# Patient Record
Sex: MALE | Race: WHITE | NOT HISPANIC OR LATINO | Employment: OTHER | URBAN - METROPOLITAN AREA
[De-identification: names, ages, dates, MRNs, and addresses within clinical notes are randomized per-mention and may not be internally consistent; named-entity substitution may affect disease eponyms.]

---

## 2017-02-21 ENCOUNTER — CONVERSION ENCOUNTER (OUTPATIENT)
Dept: FAMILY MEDICINE CLINIC | Facility: CLINIC | Age: 67
End: 2017-02-21

## 2017-02-21 LAB
ALBUMIN SERPL-MCNC: 4.7 G/DL (ref 3.6–5.1)
ALBUMIN/GLOB SERPL: ABNORMAL {RATIO} (ref 1–2.5)
ALP SERPL-CCNC: 63 UNITS/L (ref 40–115)
ALT SERPL-CCNC: 16 UNITS/L (ref 9–46)
AST SERPL-CCNC: 16 UNITS/L (ref 10–35)
BASOPHILS # BLD AUTO: ABNORMAL 10*3/MM3 (ref 0–200)
BASOPHILS NFR BLD AUTO: 0.3 %
BILIRUB SERPL-MCNC: 0.4 MG/DL (ref 0.2–1.2)
BUN SERPL-MCNC: 25 MG/DL (ref 7–25)
BUN/CREAT SERPL: ABNORMAL (ref 6–22)
CALCIUM SERPL-MCNC: 9.9 MG/DL (ref 8.6–10.3)
CHLORIDE SERPL-SCNC: 103 MMOL/L (ref 98–110)
CHOLEST SERPL-MCNC: 183 MG/DL (ref 125–200)
CHOLEST/HDLC SERPL: ABNORMAL {RATIO}
CO2 CONTENT VENOUS: 28 MMOL/L (ref 20–31)
CONV % HGB A1C: ABNORMAL %
CONV NEUTROPHILS/100 LEUKOCYTES IN BODY FLUID BY MANUAL COUNT: 60.1 %
CONV TOTAL PROTEIN: 7.4 G/DL (ref 6.1–8.1)
CREAT UR-MCNC: 1.04 MG/DL (ref 0.7–1.25)
EOSINOPHIL # BLD AUTO: 1.6 %
EOSINOPHIL # BLD AUTO: ABNORMAL 10*3/MM3 (ref 15–500)
ERYTHROCYTE [DISTWIDTH] IN BLOOD BY AUTOMATED COUNT: 14.8 % (ref 11–15)
GLOBULIN UR ELPH-MCNC: ABNORMAL G/DL (ref 1.9–3.7)
GLUCOSE SERPL-MCNC: 60 MG/DL (ref 65–99)
HCT VFR BLD AUTO: 45.4 % (ref 38.5–50)
HDLC SERPL-MCNC: 67 MG/DL
HGB BLD-MCNC: 15.2 G/DL (ref 13.2–17.1)
LDLC SERPL CALC-MCNC: ABNORMAL MG/DL
LYMPHOCYTES # BLD AUTO: ABNORMAL 10*3/MM3 (ref 850–3900)
LYMPHOCYTES NFR BLD AUTO: 31.5 %
MCH RBC QN AUTO: 30.8 PG (ref 27–33)
MCHC RBC AUTO-ENTMCNC: ABNORMAL % (ref 32–36)
MCV RBC AUTO: 92.2 FL (ref 80–100)
MONOCYTES # BLD AUTO: ABNORMAL 10*3/MICROLITER (ref 200–950)
MONOCYTES NFR BLD AUTO: 6.5 %
NEUTROPHILS # BLD AUTO: ABNORMAL 10*3/MM3 (ref 1500–7800)
PLATELET # BLD AUTO: ABNORMAL 10*3/MM3 (ref 140–400)
PMV BLD AUTO: 9.6 FL (ref 7.5–12.5)
POTASSIUM SERPL-SCNC: 4.4 MMOL/L (ref 3.5–5.3)
RBC # BLD AUTO: ABNORMAL 10*6/MM3 (ref 4.2–5.8)
SODIUM SERPL-SCNC: 140 MMOL/L (ref 135–146)
TRIGL SERPL-MCNC: 185 MG/DL
WBC # BLD AUTO: ABNORMAL K/UL (ref 3.8–10.8)

## 2017-09-26 ENCOUNTER — ON CAMPUS - OUTPATIENT (AMBULATORY)
Dept: URBAN - METROPOLITAN AREA HOSPITAL 2 | Facility: HOSPITAL | Age: 67
End: 2017-09-26
Payer: COMMERCIAL

## 2017-09-26 ENCOUNTER — OFFICE (AMBULATORY)
Dept: URBAN - METROPOLITAN AREA CLINIC 64 | Facility: CLINIC | Age: 67
End: 2017-09-26
Payer: COMMERCIAL

## 2017-09-26 ENCOUNTER — HOSPITAL ENCOUNTER (OUTPATIENT)
Dept: OTHER | Facility: HOSPITAL | Age: 67
Setting detail: SPECIMEN
Discharge: HOME | End: 2017-09-26
Attending: INTERNAL MEDICINE | Admitting: INTERNAL MEDICINE

## 2017-09-26 VITALS
DIASTOLIC BLOOD PRESSURE: 87 MMHG | OXYGEN SATURATION: 97 % | OXYGEN SATURATION: 96 % | HEART RATE: 80 BPM | SYSTOLIC BLOOD PRESSURE: 123 MMHG | DIASTOLIC BLOOD PRESSURE: 84 MMHG | RESPIRATION RATE: 16 BRPM | HEART RATE: 55 BPM | DIASTOLIC BLOOD PRESSURE: 45 MMHG | HEART RATE: 74 BPM | HEART RATE: 91 BPM | OXYGEN SATURATION: 100 % | RESPIRATION RATE: 19 BRPM | DIASTOLIC BLOOD PRESSURE: 65 MMHG | RESPIRATION RATE: 15 BRPM | DIASTOLIC BLOOD PRESSURE: 81 MMHG | HEART RATE: 77 BPM | TEMPERATURE: 97.3 F | SYSTOLIC BLOOD PRESSURE: 114 MMHG | HEART RATE: 53 BPM | RESPIRATION RATE: 18 BRPM | SYSTOLIC BLOOD PRESSURE: 115 MMHG | WEIGHT: 177 LBS | SYSTOLIC BLOOD PRESSURE: 153 MMHG | DIASTOLIC BLOOD PRESSURE: 75 MMHG | SYSTOLIC BLOOD PRESSURE: 117 MMHG | OXYGEN SATURATION: 99 % | SYSTOLIC BLOOD PRESSURE: 141 MMHG | HEIGHT: 70 IN | OXYGEN SATURATION: 98 %

## 2017-09-26 DIAGNOSIS — D12.5 BENIGN NEOPLASM OF SIGMOID COLON: ICD-10-CM

## 2017-09-26 DIAGNOSIS — D12.2 BENIGN NEOPLASM OF ASCENDING COLON: ICD-10-CM

## 2017-09-26 DIAGNOSIS — Z86.010 PERSONAL HISTORY OF COLONIC POLYPS: ICD-10-CM

## 2017-09-26 LAB
GI HISTOLOGY: A. UNSPECIFIED: (no result)
GI HISTOLOGY: B. UNSPECIFIED: (no result)
GI HISTOLOGY: PDF REPORT: (no result)

## 2017-09-26 PROCEDURE — 45385 COLONOSCOPY W/LESION REMOVAL: CPT | Mod: PT

## 2017-09-26 PROCEDURE — 88305 TISSUE EXAM BY PATHOLOGIST: CPT | Mod: 26

## 2017-09-26 RX ADMIN — PROPOFOL: 10 INJECTION, EMULSION INTRAVENOUS at 08:19

## 2017-11-06 ENCOUNTER — HOSPITAL ENCOUNTER (OUTPATIENT)
Dept: LAB | Facility: HOSPITAL | Age: 67
Discharge: HOME | End: 2017-11-06
Attending: UROLOGY | Admitting: UROLOGY

## 2017-11-06 LAB — PSA SERPL-MCNC: 0.49 NG/ML (ref 0–4)

## 2017-12-21 ENCOUNTER — HOSPITAL ENCOUNTER (OUTPATIENT)
Dept: FAMILY MEDICINE CLINIC | Facility: CLINIC | Age: 67
Setting detail: SPECIMEN
Discharge: HOME | End: 2017-12-21
Attending: INTERNAL MEDICINE | Admitting: INTERNAL MEDICINE

## 2017-12-21 LAB
ANION GAP SERPL CALC-SCNC: 15.4 MMOL/L (ref 10–20)
BASOPHILS # BLD AUTO: 0.1 10*3/UL (ref 0–0.2)
BASOPHILS NFR BLD AUTO: 1 % (ref 0–2)
BUN SERPL-MCNC: 15 MG/DL (ref 8–20)
BUN/CREAT SERPL: 13.6 (ref 6.2–20.3)
CALCIUM SERPL-MCNC: 9.4 MG/DL (ref 8.9–10.3)
CHLORIDE SERPL-SCNC: 100 MMOL/L (ref 101–111)
CONV CO2: 25 MMOL/L (ref 22–32)
CREAT UR-MCNC: 1.1 MG/DL (ref 0.7–1.2)
DIFFERENTIAL METHOD BLD: (no result)
EOSINOPHIL # BLD AUTO: 0.4 10*3/UL (ref 0–0.3)
EOSINOPHIL # BLD AUTO: 3 % (ref 0–3)
ERYTHROCYTE [DISTWIDTH] IN BLOOD BY AUTOMATED COUNT: 13.3 % (ref 11.5–14.5)
GLUCOSE SERPL-MCNC: 92 MG/DL (ref 65–99)
HCT VFR BLD AUTO: 40.6 % (ref 40–54)
HGB BLD-MCNC: 13.5 G/DL (ref 14–18)
LYMPHOCYTES # BLD AUTO: 2.3 10*3/UL (ref 0.8–4.8)
LYMPHOCYTES NFR BLD AUTO: 16 % (ref 18–42)
MCH RBC QN AUTO: 30.4 PG (ref 26–32)
MCHC RBC AUTO-ENTMCNC: 33.3 G/DL (ref 32–36)
MCV RBC AUTO: 91.3 FL (ref 80–94)
MONOCYTES # BLD AUTO: 1 10*3/UL (ref 0.1–1.3)
MONOCYTES NFR BLD AUTO: 7 % (ref 2–11)
NEUTROPHILS # BLD AUTO: 10.5 10*3/UL (ref 2.3–8.6)
NEUTROPHILS NFR BLD AUTO: 73 % (ref 50–75)
NRBC BLD AUTO-RTO: 0 /100{WBCS}
NRBC/RBC NFR BLD MANUAL: 0 10*3/UL
PLATELET # BLD AUTO: 473 10*3/UL (ref 150–450)
PMV BLD AUTO: 8 FL (ref 7.4–10.4)
POTASSIUM SERPL-SCNC: 4.4 MMOL/L (ref 3.6–5.1)
RBC # BLD AUTO: 4.45 10*6/UL (ref 4.6–6)
SODIUM SERPL-SCNC: 136 MMOL/L (ref 136–144)
WBC # BLD AUTO: 14.2 10*3/UL (ref 4.5–11.5)

## 2018-01-19 ENCOUNTER — HOSPITAL ENCOUNTER (OUTPATIENT)
Dept: GENERAL RADIOLOGY | Facility: HOSPITAL | Age: 68
Discharge: HOME | End: 2018-01-19
Attending: INTERNAL MEDICINE | Admitting: INTERNAL MEDICINE

## 2018-01-19 ENCOUNTER — HOSPITAL ENCOUNTER (OUTPATIENT)
Dept: FAMILY MEDICINE CLINIC | Facility: CLINIC | Age: 68
Setting detail: SPECIMEN
Discharge: HOME | End: 2018-01-19
Attending: INTERNAL MEDICINE | Admitting: INTERNAL MEDICINE

## 2018-01-19 LAB
ALBUMIN SERPL-MCNC: 4.2 G/DL (ref 3.5–4.8)
ALBUMIN/GLOB SERPL: 1.5 {RATIO} (ref 1–1.7)
ALP SERPL-CCNC: 59 IU/L (ref 32–91)
ALT SERPL-CCNC: 23 IU/L (ref 17–63)
ANION GAP SERPL CALC-SCNC: 13.2 MMOL/L (ref 10–20)
AST SERPL-CCNC: 23 IU/L (ref 15–41)
BASOPHILS # BLD AUTO: 0.1 10*3/UL (ref 0–0.2)
BASOPHILS NFR BLD AUTO: 1 % (ref 0–2)
BILIRUB SERPL-MCNC: 0.7 MG/DL (ref 0.3–1.2)
BUN SERPL-MCNC: 22 MG/DL (ref 8–20)
BUN/CREAT SERPL: 16.9 (ref 6.2–20.3)
CALCIUM SERPL-MCNC: 9.7 MG/DL (ref 8.9–10.3)
CHLORIDE SERPL-SCNC: 100 MMOL/L (ref 101–111)
CHOLEST SERPL-MCNC: 204 MG/DL
CHOLEST/HDLC SERPL: 3.4 {RATIO}
CONV CO2: 26 MMOL/L (ref 22–32)
CONV LDL CHOLESTEROL DIRECT: 122 MG/DL (ref 0–100)
CONV TOTAL PROTEIN: 7 G/DL (ref 6.1–7.9)
CREAT UR-MCNC: 1.3 MG/DL (ref 0.7–1.2)
DIFFERENTIAL METHOD BLD: (no result)
EOSINOPHIL # BLD AUTO: 0.3 10*3/UL (ref 0–0.3)
EOSINOPHIL # BLD AUTO: 3 % (ref 0–3)
ERYTHROCYTE [DISTWIDTH] IN BLOOD BY AUTOMATED COUNT: 14.6 % (ref 11.5–14.5)
GLOBULIN UR ELPH-MCNC: 2.8 G/DL (ref 2.5–3.8)
GLUCOSE SERPL-MCNC: 81 MG/DL (ref 65–99)
HCT VFR BLD AUTO: 42.9 % (ref 40–54)
HDLC SERPL-MCNC: 61 MG/DL
HGB BLD-MCNC: 14.6 G/DL (ref 14–18)
LDLC/HDLC SERPL: 2 {RATIO}
LIPID INTERPRETATION: ABNORMAL
LYMPHOCYTES # BLD AUTO: 2.8 10*3/UL (ref 0.8–4.8)
LYMPHOCYTES NFR BLD AUTO: 29 % (ref 18–42)
MCH RBC QN AUTO: 31.3 PG (ref 26–32)
MCHC RBC AUTO-ENTMCNC: 34.1 G/DL (ref 32–36)
MCV RBC AUTO: 92 FL (ref 80–94)
MONOCYTES # BLD AUTO: 0.9 10*3/UL (ref 0.1–1.3)
MONOCYTES NFR BLD AUTO: 10 % (ref 2–11)
NEUTROPHILS # BLD AUTO: 5.5 10*3/UL (ref 2.3–8.6)
NEUTROPHILS NFR BLD AUTO: 57 % (ref 50–75)
NRBC BLD AUTO-RTO: 0 /100{WBCS}
NRBC/RBC NFR BLD MANUAL: 0 10*3/UL
PLATELET # BLD AUTO: (no result) 10*3/UL (ref 150–450)
PMV BLD AUTO: 9.1 FL (ref 7.4–10.4)
POTASSIUM SERPL-SCNC: 4.2 MMOL/L (ref 3.6–5.1)
RBC # BLD AUTO: 4.67 10*6/UL (ref 4.6–6)
SODIUM SERPL-SCNC: 135 MMOL/L (ref 136–144)
TRIGL SERPL-MCNC: 159 MG/DL
VLDLC SERPL CALC-MCNC: 21.4 MG/DL
WBC # BLD AUTO: 9.6 10*3/UL (ref 4.5–11.5)

## 2018-11-13 ENCOUNTER — HOSPITAL ENCOUNTER (OUTPATIENT)
Dept: LAB | Facility: HOSPITAL | Age: 68
Discharge: HOME | End: 2018-11-13
Attending: UROLOGY | Admitting: UROLOGY

## 2019-03-01 ENCOUNTER — CONVERSION ENCOUNTER (OUTPATIENT)
Dept: FAMILY MEDICINE CLINIC | Facility: CLINIC | Age: 69
End: 2019-03-01

## 2019-04-17 ENCOUNTER — HOSPITAL ENCOUNTER (OUTPATIENT)
Dept: SLEEP MEDICINE | Facility: HOSPITAL | Age: 69
Discharge: HOME | End: 2019-04-17
Attending: INTERNAL MEDICINE | Admitting: INTERNAL MEDICINE

## 2019-04-19 ENCOUNTER — HOSPITAL ENCOUNTER (OUTPATIENT)
Dept: SLEEP MEDICINE | Facility: HOSPITAL | Age: 69
Discharge: HOME | End: 2019-04-19
Attending: INTERNAL MEDICINE | Admitting: INTERNAL MEDICINE

## 2019-06-12 VITALS
DIASTOLIC BLOOD PRESSURE: 82 MMHG | RESPIRATION RATE: 18 BRPM | BODY MASS INDEX: 28.86 KG/M2 | WEIGHT: 201.6 LBS | HEIGHT: 70 IN | SYSTOLIC BLOOD PRESSURE: 160 MMHG | OXYGEN SATURATION: 96 % | HEART RATE: 73 BPM

## 2019-06-27 RX ORDER — LISINOPRIL AND HYDROCHLOROTHIAZIDE 20; 12.5 MG/1; MG/1
1 TABLET ORAL DAILY
COMMUNITY
End: 2020-01-02 | Stop reason: SDUPTHER

## 2019-07-01 ENCOUNTER — OFFICE VISIT (OUTPATIENT)
Dept: SLEEP MEDICINE | Facility: HOSPITAL | Age: 69
End: 2019-07-01

## 2019-07-01 VITALS
WEIGHT: 192 LBS | DIASTOLIC BLOOD PRESSURE: 74 MMHG | OXYGEN SATURATION: 95 % | SYSTOLIC BLOOD PRESSURE: 120 MMHG | BODY MASS INDEX: 27.49 KG/M2 | HEART RATE: 63 BPM | HEIGHT: 70 IN

## 2019-07-01 DIAGNOSIS — G47.33 OBSTRUCTIVE SLEEP APNEA, ADULT: Primary | ICD-10-CM

## 2019-07-01 PROCEDURE — G0463 HOSPITAL OUTPT CLINIC VISIT: HCPCS

## 2019-07-01 RX ORDER — ASPIRIN 81 MG/1
81 TABLET ORAL DAILY
COMMUNITY
End: 2022-05-25

## 2019-07-01 NOTE — PROGRESS NOTES
SLEEP MEDICINE CONSULT      Patient Identification:     Name: Jaret Monte   Age: 69 y.o.   Gender: male   : 1950   MRN: [id number]     Date of consult: 2019    PCP/Referring Physician(s):     PCP: Mari Arechiga MD  Referring Provider: Annel Mensah MD      Chief Complaint:   Obstructive sleep apnea initial follow-up for compliance after using the mask with given pressures..    History of Present Illness:   This is a very pleasant 69 years old  gentleman who underwent a nocturnal split-night polysomnogram for evaluation of obstructive sleep apnea.  He was provided with an auto BiPAP mode of therapy with a full facemask.  He is here today for initial follow-up for compliance.  Memory card has been downloaded.    Uses a full facemask which is alvina view.  He finds the mask quite uncomfortable.  Air leaks bother him.  He has to tighten his mask in order to avoid air leaks.  The nasal portion of alvina view mask usually rubs against his nostrils.  He finds the mask quite uncomfortable.  The pressures are quite tolerable to him.  Uses humidifier on regular basis.    Despite the mask being uncomfortable he has slept better with the mask and given pressures.  He does not snore with the mask in place.  Denies symptoms of dry mouth or headache at night or in the morning no symptoms of sinus congestion at night or in the morning.    He has woken up in the morning awake alert and rested remains active throughout the day. He  does not like any caffeinated beverages.  He has not taken any naps during the daytime.    Allergies, Medications, and Past History:   Allergies:    No Known Allergies  Current Medications:    Prior to Admission medications    Medication Sig Start Date End Date Taking? Authorizing Provider   aspirin 81 MG EC tablet Take 81 mg by mouth Daily.   Yes Provider, MD Johanny   lisinopril-hydrochlorothiazide (PRINZIDE,ZESTORETIC) 20-12.5 MG per tablet Take 1 tablet  "by mouth Daily.   Yes Provider, MD Johanny     Past Medical History:    Past Medical History:   Diagnosis Date   • BPH (benign prostatic hyperplasia)    • Hearing disorder    • Hepatitis C    • Hypertension       Past Surgical History:    No past surgical history on file.   Social History:    Social History     Tobacco Use   • Smoking status: Former Smoker   • Tobacco comment: Quit 2007   Substance Use Topics   • Alcohol use: Yes     Frequency: Monthly or less   • Drug use: Not on file     Family Medical History:  No family history on file.      Review of Systems:   Constitutional:  Denies fever or chills and weight gain  Eyes:  Denies change in visual acuity   HENT:  Denies nasal congestion, sore throat or post nasal drainage   Respiratory:  Denies cough, shortness of breath or dyspnea on exertion    Cardiovascular:  Denies chest pain or edema   GI:  Denies abdominal pain, nausea, vomiting, bloody stools or diarrhea   :  Denies dysuria or nocturia   Musculoskeletal:  Denies back pain or joint pain   Integument:  Denies rash   Neurologic:  Denies headache, focal weakness or sensory changes. No history of stroke or seizures.   Endocrine:  Denies polyuria or polydipsia   Lymphatic:  Denies swollen glands   Psychiatric:  Denies depression, anxiety or claustrophobia      Physical Exam:     Vitals:    07/01/19 1542   BP: 120/74   BP Location: Left arm   Pulse: 63   SpO2: 95%   Weight: 87.1 kg (192 lb)   Height: 177.8 cm (70\")     HEENT: Head is normocephalic, atraumatic, normal distribution of hair. Pupils reactive to light. Ocular movements intact. No nasal congestion on sniff test. No deviated nasal septum. No micrognathia.  Throat: Mallapatti stage 4, tongue midline, mucosa moist.  Neck: no JVD, thyromegaly, mass, +midline trachea, No lymphadenopathy.  Lungs: clear, no wheeze, rhonchi, crackles  Cardiovascular: S1, S2, RRR no murmurs, rubs or gallops  Abd: +BS's soft NT/ND, no CVA tenderness.    Ext: no edema, " cyanosis, clubbing, pulses intact  Neuro: Awake alert, oriented to time place and person. Grossly intact to motor, sensory cerebral, and cerebellar (without focal deficit)  Psych: No overt roxy, depression, psychosis or inappropriate behavior  Skin: No rash, cellulitis, or ulcerations.  No facial rash or erosions    DIAGNOSTIC DATA  Memory card download shows data from 5/10/2019 to 6/30/2019 overall 52 days of data reviewed 100% use noted.    Hours she was 9 hours 57 minutes.  Minimum time use 4 hours 3 minutes.  The question of the time the mask has been used for more than 4 hours.  The average apnea pop index is 5.6 events per hour.  He uses an auto BiPAP mode of therapy with a pressure range between 12 and 22.  Pressure support between 4 and 6.  Assessment/Plan:   Obstructive sleep apnea:  This is a very pleasant gentleman who has been diagnosed with obstructive sleep apnea.  He uses an auto BiPAP mode of therapy with a full facemask.  He is compliant with therapy.  Is getting benefit from it.    The results of the memory card download were discussed in detail with the patient all questions were answered.    Finds this mask right and comfortable.  He uses an Jayashree view mask.  Different varieties of masks were discussed in detail with him all questions were answered.  He may benefit from a full facemask of a different variety.  Recommendations.  He is advised to continue using his mask with given pressures on a nightly basis.  Is advised to use the mask with given pressures for at least 4 hours for minimum benefit or as long as he sleeps a maximum benefit.  Is advised to use the mask if he takes a nap during the daytime.  A full facemask is recommended of a different type and variety.  Allergic rhinitis:  He has remained symptomatic.  Recommendation aggressive therapy is recommended.    Driving instructions. Patient is advised to avoid driving if tired or somnolent. To avoid all alcoholic beverages or medications  causing somnolence.         Diagnosis Plan   1. Obstructive sleep apnea, adult  CPAP Therapy     No orders of the defined types were placed in this encounter.    Orders Placed This Encounter   Procedures   • CPAP Therapy     To  Chet ferrell.  Patient uses a philippe view FFM. Extremely uncomfortable for him.  PLEASE CHANGE THE MASK TO  ANOTHER VARIETY OF FULL FACE MASK.     Order Specific Question:   Equipment     Answer:    BiPAP     Order Specific Question:   PAP Tubing (1 per 3 months)     Answer:    6' Standard tubing     Order Specific Question:   PAP Mask (1 per 3 months)     Answer:    Full face mask     Order Specific Question:   Mask interface (1 per month)     Answer:    Face Mask Interface     Order Specific Question:   PAP Accessories     Answer:    Water Chamber for PAP Humidifier (1 per 6 month) &  Filter PAP Disposable (2 per month) &  Filter PAP Non-Disposable (1 per 6 months) &  Chinstrap to be used with PAP (1 per 6 months) &  Headgear to be used with PAP (1 per 6 mo)     Order Specific Question:   Does the patient have Obstructive Sleep Apnea or other qualifying diagnosis for PAP ordered?     Answer:   Yes     Order Specific Question:   Does the patient require humidification?     Answer:   Yes     Order Specific Question:   Humidifier     Answer:    Humidifier Heated for CPAP or BiPAP     Order Specific Question:   If ordering a BiPAP for MAXIMO a CPAP has been tried and/or ruled out?     Answer:   Yes     Order Specific Question:   The patient requires a PAP Device & continued use of Supplies to administer effective PAP therapy at the frequency of use ordered above     Answer:   Yes     Order Specific Question:   Initial Supplies and refills authorized for 12 months?     Answer:   Yes     Order Specific Question:   Length of Need (99 Months = Lifetime)     Answer:   99 Months = Lifetime         Annel Mensah MD  7/1/2019  4:21 PM

## 2019-10-02 ENCOUNTER — OFFICE VISIT (OUTPATIENT)
Dept: SLEEP MEDICINE | Facility: HOSPITAL | Age: 69
End: 2019-10-02

## 2019-10-02 VITALS
WEIGHT: 196.2 LBS | OXYGEN SATURATION: 97 % | HEART RATE: 66 BPM | SYSTOLIC BLOOD PRESSURE: 128 MMHG | DIASTOLIC BLOOD PRESSURE: 73 MMHG | BODY MASS INDEX: 28.09 KG/M2 | HEIGHT: 70 IN

## 2019-10-02 DIAGNOSIS — G47.33 OBSTRUCTIVE SLEEP APNEA, ADULT: Primary | ICD-10-CM

## 2019-10-02 PROCEDURE — G0463 HOSPITAL OUTPT CLINIC VISIT: HCPCS

## 2019-10-02 RX ORDER — MV-MIN/FOLIC/VIT K/LYCOP/COQ10 200-100MCG
CAPSULE ORAL
COMMUNITY
Start: 2018-01-19 | End: 2021-04-26

## 2019-10-02 NOTE — PROGRESS NOTES
SLEEP MEDICINE CONSULT      Patient Identification:     Name: Jaret Monte   Age: 69 y.o.   Gender: male   : 1950   MRN: [id number]     Date of consult: 10/2/2019    PCP/Referring Physician(s):     PCP: Mari Arechiga MD  Referring Provider: Annel Mensah MD      Chief Complaint:   Obstructive sleep apnea 3 months follow-up for compliance after using the mask with given pressures..    History of Present Illness:   This is a very pleasant 69 years old  gentleman who underwent a nocturnal split-night polysomnogram for evaluation of obstructive sleep apnea.  He was provided with an auto BiPAP mode of therapy with a full facemask.  He is here today for l follow-up for compliance after using the mask with given pressures 3 months.  Memory card has been downloaded.    He has used an Jayashree view fullface mask which she found very uncomfortable.  Presently he has changed the mask to a different type.  He finds the mask quite comfortable.  Fever air leaks noted by him.  He has wear an  interface with the mask.  .  The pressures are quite tolerable to him.  Uses humidifier on regular basis.    He has slept much better with the given pressures in the mask.  His bedtime nowadays is 8:30 PM.  Takes him approximately 15 minutes to doze off.  He usually wakes up at a 5 in the morning to start his day.  He does not wake up at night many times for a bathroom trip.  He denies any symptoms of headache or heartburn at night or in the morning.  He denies any symptoms of nasal congestion or dry mouth at night or in the morning.  He does not snore with the mask in place.    He has woken up in the morning awake alert and rested remains active throughout the day. He  does not like any caffeinated beverages.  He has not taken any naps during the daytime.  [address] sleepiness score is 10/24.    Allergies, Medications, and Past History:   Allergies:    No Known Allergies  Current Medications:    Prior to  "Admission medications    Medication Sig Start Date End Date Taking? Authorizing Provider   aspirin 81 MG EC tablet Take 81 mg by mouth Daily.   Yes Johanny Rocha MD   lisinopril-hydrochlorothiazide (PRINZIDE,ZESTORETIC) 20-12.5 MG per tablet Take 1 tablet by mouth Daily.   Yes ProviderJohanny MD     Past Medical History:    Past Medical History:   Diagnosis Date   • BPH (benign prostatic hyperplasia)    • Hearing disorder    • Hepatitis C    • Hypertension       Past Surgical History:    No past surgical history on file.   Social History:    Social History     Tobacco Use   • Smoking status: Former Smoker   • Tobacco comment: Quit 2007   Substance Use Topics   • Alcohol use: Yes     Frequency: Monthly or less   • Drug use: Not on file     Family Medical History:  History reviewed. No pertinent family history.      Review of Systems:   Constitutional:  Denies fever or chills and weight gain  Eyes:  Denies change in visual acuity   HENT:  Denies nasal congestion, sore throat or post nasal drainage   Respiratory:  Denies cough, shortness of breath or dyspnea on exertion    Cardiovascular:  Denies chest pain or edema   GI:  Denies abdominal pain, nausea, vomiting, bloody stools or diarrhea   :  Denies dysuria or nocturia   Musculoskeletal:  Denies back pain or joint pain   Integument:  Denies rash   Neurologic:  Denies headache, focal weakness or sensory changes. No history of stroke or seizures.   Endocrine:  Denies polyuria or polydipsia   Lymphatic:  Denies swollen glands   Psychiatric:  Denies depression, anxiety or claustrophobia      Physical Exam:     Vitals:    10/02/19 1124   BP: 128/73   Pulse: 66   SpO2: 97%   Weight: 89 kg (196 lb 3.2 oz)   Height: 177.8 cm (70\")     HEENT: Head is normocephalic, atraumatic, normal distribution of hair. Pupils reactive to light. Ocular movements intact. No nasal congestion on sniff test. No deviated nasal septum. No micrognathia.  Throat: Mallapatti stage 4, " tongue midline, mucosa moist.  Neck: no JVD, thyromegaly, mass, +midline trachea, No lymphadenopathy.  Lungs: clear, no wheeze, rhonchi, crackles  Cardiovascular: S1, S2, RRR no murmurs, rubs or gallops  Abd: +BS's soft NT/ND, no CVA tenderness.    Ext: no edema, cyanosis, clubbing, pulses intact  Neuro: Awake alert, oriented to time place and person. Grossly intact to motor, sensory cerebral, and cerebellar (without focal deficit)  Psych: No overt roxy, depression, psychosis or inappropriate behavior  Skin: No rash, cellulitis, or ulcerations.  No facial rash or erosions    DIAGNOSTIC DATA  Memory card download shows data from 7/10/2019 to 10/01/2019 overall 93 days of data reviewed 98.9% use noted.  Maximum hours used in hours and 42 minutes.  Minimum time used is 4 hours 3 minutes.  98.9% of the time the mask has been used for more than 4 hours.  The average apnea hypopnia index is 2.9 events per hour an auto BiPAP mode of therapy with a pressure range between 12-22 CWP.  Pressure support between 4 and 6 CWP.    Assessment/Plan:   Obstructive sleep apnea:  This is a very pleasant gentleman who has been diagnosed with obstructive sleep apnea.  He uses an auto BiPAP mode of therapy with a full facemask.  He is compliant with therapy.  Is getting benefit from it.    The results of the memory card download were discussed in detail with the patient all questions were answered.  Recommendations.  He is advised to continue using his mask with given pressures on a nightly basis.  Is advised to use the mask with given pressures for at least 4 hours for minimum benefit or as long as he sleeps a maximum benefit.  Is advised to use the mask if he takes a nap during the daytime.    Allergic rhinitis:  He has remained symptomatic.  Recommendation aggressive therapy is recommended.  He may benefit from allergy testing.  Driving instructions. Patient is advised to avoid driving if tired or somnolent. To avoid all alcoholic  beverages or medications causing somnolence.         Diagnosis Plan   1. Obstructive sleep apnea, adult  CPAP Therapy     No orders of the defined types were placed in this encounter.    Orders Placed This Encounter   Procedures   • CPAP Therapy     Order Specific Question:   PAP Tubing (1 per 3 months)     Answer:    6' Standard tubing     Order Specific Question:   PAP Mask (1 per 3 months)     Answer:    Full face mask     Order Specific Question:   Mask interface (1 per month)     Answer:    Face Mask Interface     Order Specific Question:   PAP Accessories     Answer:    Water Chamber for PAP Humidifier (1 per 6 month) &  Filter PAP Disposable (2 per month) &  Filter PAP Non-Disposable (1 per 6 months) &  Chinstrap to be used with PAP (1 per 6 months) &  Headgear to be used with PAP (1 per 6 mo)     Order Specific Question:   Does the patient have Obstructive Sleep Apnea or other qualifying diagnosis for PAP ordered?     Answer:   Yes     Order Specific Question:   Does the patient require humidification?     Answer:   Yes     Order Specific Question:   Humidifier     Answer:    Humidifier Heated for CPAP or BiPAP     Order Specific Question:   The patient requires a PAP Device & continued use of Supplies to administer effective PAP therapy at the frequency of use ordered above     Answer:   Yes     Order Specific Question:   Initial Supplies and refills authorized for 12 months?     Answer:   Yes     Order Specific Question:   The face to face evaluation was performed on     Answer:   10/2/2019     Order Specific Question:   Which [employer] company is this being sent to?     Answer:   [facility] [[id number]]     Order Specific Question:   Length of Need (99 Months = Lifetime)     Answer:   99 Months = Lifetime         Annel Mensah MD  10/2/2019  3:46 PM

## 2019-12-02 ENCOUNTER — TRANSCRIBE ORDERS (OUTPATIENT)
Dept: ADMINISTRATIVE | Facility: HOSPITAL | Age: 69
End: 2019-12-02

## 2019-12-02 ENCOUNTER — LAB (OUTPATIENT)
Dept: LAB | Facility: HOSPITAL | Age: 69
End: 2019-12-02

## 2019-12-02 DIAGNOSIS — R35.1 BPH ASSOCIATED WITH NOCTURIA: ICD-10-CM

## 2019-12-02 DIAGNOSIS — R35.1 BPH ASSOCIATED WITH NOCTURIA: Primary | ICD-10-CM

## 2019-12-02 DIAGNOSIS — N40.1 BPH ASSOCIATED WITH NOCTURIA: Primary | ICD-10-CM

## 2019-12-02 DIAGNOSIS — N40.1 BPH ASSOCIATED WITH NOCTURIA: ICD-10-CM

## 2019-12-02 LAB — PSA SERPL-MCNC: 0.86 NG/ML (ref 0–4)

## 2019-12-02 PROCEDURE — 36415 COLL VENOUS BLD VENIPUNCTURE: CPT

## 2019-12-02 PROCEDURE — 84153 ASSAY OF PSA TOTAL: CPT

## 2019-12-02 PROCEDURE — G0103 PSA SCREENING: HCPCS

## 2020-01-02 ENCOUNTER — OFFICE VISIT (OUTPATIENT)
Dept: FAMILY MEDICINE CLINIC | Facility: CLINIC | Age: 70
End: 2020-01-02

## 2020-01-02 VITALS
SYSTOLIC BLOOD PRESSURE: 120 MMHG | HEART RATE: 82 BPM | RESPIRATION RATE: 16 BRPM | BODY MASS INDEX: 29.35 KG/M2 | OXYGEN SATURATION: 97 % | WEIGHT: 205 LBS | TEMPERATURE: 99.5 F | DIASTOLIC BLOOD PRESSURE: 78 MMHG | HEIGHT: 70 IN

## 2020-01-02 DIAGNOSIS — I10 ESSENTIAL HYPERTENSION: ICD-10-CM

## 2020-01-02 DIAGNOSIS — J01.90 ACUTE NON-RECURRENT SINUSITIS, UNSPECIFIED LOCATION: Primary | ICD-10-CM

## 2020-01-02 PROBLEM — N40.0 ENLARGED PROSTATE WITHOUT LOWER URINARY TRACT SYMPTOMS (LUTS): Status: ACTIVE | Noted: 2017-02-20

## 2020-01-02 PROCEDURE — 99213 OFFICE O/P EST LOW 20 MIN: CPT | Performed by: FAMILY MEDICINE

## 2020-01-02 RX ORDER — LISINOPRIL AND HYDROCHLOROTHIAZIDE 20; 12.5 MG/1; MG/1
1 TABLET ORAL DAILY
Qty: 90 TABLET | Refills: 3 | Status: SHIPPED | OUTPATIENT
Start: 2020-01-02 | End: 2021-02-07

## 2020-01-02 RX ORDER — DOXYCYCLINE 100 MG/1
100 CAPSULE ORAL 2 TIMES DAILY
Qty: 20 CAPSULE | Refills: 0 | Status: SHIPPED | OUTPATIENT
Start: 2020-01-02 | End: 2020-03-02

## 2020-01-02 NOTE — PROGRESS NOTES
Chief Complaint   Patient presents with   • Cough       Subjective   Jaret Monte is a 69 y.o. male.     Cough   The current episode started 1 to 4 weeks ago. The problem has been gradually worsening. The cough is productive of sputum. Associated symptoms include rhinorrhea and a sore throat. Pertinent negatives include no chills, ear pain, eye redness, fever, shortness of breath or wheezing. Associated symptoms comments: Chest congestion. The symptoms are aggravated by lying down (wearing cpap machine). He has tried OTC cough suppressant for the symptoms. The treatment provided mild relief. His past medical history is significant for pneumonia.        I have reviewed and updated his medications, medical history and problem list during today's office visit.       Past Medical History :  Active Ambulatory Problems     Diagnosis Date Noted   • Other dorsalgia 12/07/2012   • Hypertension 01/02/2020   • Enlarged prostate without lower urinary tract symptoms (luts) 02/20/2017   • Encounter for screening for malignant neoplasm of prostate 07/28/2016   • History of hepatic disease 12/07/2012     Resolved Ambulatory Problems     Diagnosis Date Noted   • No Resolved Ambulatory Problems     Past Medical History:   Diagnosis Date   • Acute non-recurrent frontal sinusitis    • Advanced care planning/counseling discussion    • Allergic rhinitis due to pollen    • BPH (benign prostatic hyperplasia)    • Essential hypertension    • Fever and chills    • Hearing disorder    • Hepatitis C    • History of tobacco use    • Snoring        Medication List:    Current Outpatient Medications:   •  aspirin 81 MG EC tablet, Take 81 mg by mouth Daily., Disp: , Rfl:   •  lisinopril-hydrochlorothiazide (PRINZIDE,ZESTORETIC) 20-12.5 MG per tablet, Take 1 tablet by mouth Daily., Disp: , Rfl:   •  Multiple Vitamins-Minerals (DAILY MULTIVITAMIN) capsule, DAILY MULTIVITAMIN CAPS, Disp: , Rfl:       Social History     Tobacco Use   • Smoking  "status: Former Smoker   • Tobacco comment: Quit 2007   Substance Use Topics   • Alcohol use: Not Currently     Frequency: Monthly or less       Review of Systems   Constitutional: Negative for chills and fever.   HENT: Positive for congestion, rhinorrhea and sore throat. Negative for ear pain and trouble swallowing.    Eyes: Negative for pain, discharge and redness.   Respiratory: Positive for cough. Negative for shortness of breath and wheezing.    Gastrointestinal: Negative for diarrhea, nausea and vomiting.   Neurological: Negative for dizziness, syncope and headache.       Objective   Vitals:    01/02/20 1355   BP: 120/78   Pulse: 82   Resp: 16   Temp: 99.5 °F (37.5 °C)   TempSrc: Oral   SpO2: 97%   Weight: 93 kg (205 lb)   Height: 177.8 cm (70\")     Body mass index is 29.41 kg/m².  Physical Exam   Constitutional: He is oriented to person, place, and time. He appears well-developed and well-nourished. No distress.   HENT:   Head: Normocephalic and atraumatic.   Right Ear: External ear and ear canal normal. Tympanic membrane is not erythematous.   Left Ear: External ear and ear canal normal. Tympanic membrane is not erythematous.   Mouth/Throat: Uvula is midline. Posterior oropharyngeal erythema present. No oropharyngeal exudate or posterior oropharyngeal edema.   Eyes: Pupils are equal, round, and reactive to light. Conjunctivae and EOM are normal. Right eye exhibits no discharge. Left eye exhibits no discharge.   Neck: Normal range of motion. Neck supple.   Cardiovascular: Normal rate, regular rhythm and normal heart sounds.   No murmur heard.  Pulmonary/Chest: Effort normal and breath sounds normal. He has no wheezes. He has no rales.   Abdominal: Soft.   Musculoskeletal: He exhibits no edema.   Lymphadenopathy:     He has no cervical adenopathy.   Neurological: He is alert and oriented to person, place, and time.   Skin: Skin is warm. Capillary refill takes less than 2 seconds. No rash noted.   Psychiatric: " He has a normal mood and affect. His behavior is normal.         Lab Results   Component Value Date    PSA 0.856 12/02/2019          Assessment/Plan     Diagnoses and all orders for this visit:    1. Acute non-recurrent sinusitis, unspecified location (Primary)  -     doxycycline (MONODOX) 100 MG capsule; Take 1 capsule by mouth 2 (Two) Times a Day.  Dispense: 20 capsule; Refill: 0    2. Essential hypertension  Comments:  Stable.  Refills given.  Labs current (done by [facility]).  Orders:  -     lisinopril-hydrochlorothiazide (PRINZIDE,ZESTORETIC) 20-12.5 MG per tablet; Take 1 tablet by mouth Daily.  Dispense: 90 tablet; Refill: 3    F/U for medicare wellness exam after 2/27/20.    No follow-ups on file.

## 2020-03-02 ENCOUNTER — OFFICE VISIT (OUTPATIENT)
Dept: FAMILY MEDICINE CLINIC | Facility: CLINIC | Age: 70
End: 2020-03-02

## 2020-03-02 VITALS
SYSTOLIC BLOOD PRESSURE: 127 MMHG | HEART RATE: 57 BPM | DIASTOLIC BLOOD PRESSURE: 73 MMHG | TEMPERATURE: 97.7 F | BODY MASS INDEX: 29.58 KG/M2 | WEIGHT: 206.6 LBS | HEIGHT: 70 IN | OXYGEN SATURATION: 97 %

## 2020-03-02 DIAGNOSIS — G47.33 OBSTRUCTIVE SLEEP APNEA: ICD-10-CM

## 2020-03-02 DIAGNOSIS — Z87.891 PERSONAL HISTORY OF TOBACCO USE: ICD-10-CM

## 2020-03-02 DIAGNOSIS — Z12.5 ENCOUNTER FOR SCREENING FOR MALIGNANT NEOPLASM OF PROSTATE: ICD-10-CM

## 2020-03-02 DIAGNOSIS — I10 ESSENTIAL HYPERTENSION: ICD-10-CM

## 2020-03-02 DIAGNOSIS — Z78.9 FULL CODE STATUS: ICD-10-CM

## 2020-03-02 DIAGNOSIS — Z00.00 MEDICARE ANNUAL WELLNESS VISIT, SUBSEQUENT: Primary | ICD-10-CM

## 2020-03-02 DIAGNOSIS — Z23 PNEUMOCOCCAL VACCINATION GIVEN: ICD-10-CM

## 2020-03-02 PROCEDURE — 99497 ADVNCD CARE PLAN 30 MIN: CPT | Performed by: FAMILY MEDICINE

## 2020-03-02 PROCEDURE — 90732 PPSV23 VACC 2 YRS+ SUBQ/IM: CPT | Performed by: FAMILY MEDICINE

## 2020-03-02 PROCEDURE — G0009 ADMIN PNEUMOCOCCAL VACCINE: HCPCS | Performed by: FAMILY MEDICINE

## 2020-03-02 PROCEDURE — 99213 OFFICE O/P EST LOW 20 MIN: CPT | Performed by: FAMILY MEDICINE

## 2020-03-02 PROCEDURE — G0439 PPPS, SUBSEQ VISIT: HCPCS | Performed by: FAMILY MEDICINE

## 2020-03-02 NOTE — PROGRESS NOTES
The ABCs of the Annual Wellness Visit  Subsequent Medicare Wellness Visit    Chief Complaint   Patient presents with   • Medicare Wellness-subsequent   • Hypertension       Subjective   History of Present Illness:  Jaret Monte is a 70 y.o. male who presents for a Subsequent Medicare Wellness Visit.    Hypertension   This is a chronic problem. The current episode started more than 1 year ago. The problem is controlled. Pertinent negatives include no blurred vision, chest pain, headaches, orthopnea, palpitations, peripheral edema or shortness of breath. Current antihypertension treatment includes ACE inhibitors and diuretics. The current treatment provides significant improvement. There are no compliance problems.      Patient follows routinely with Dr. Crawford, who is monitoring his PSA levels and doing his digital rectal exams.    HEALTH RISK ASSESSMENT    Recent Hospitalizations:  No hospitalization(s) within the last year.    Current Medical Providers:  Patient Care Team:  Mari Arechiga MD as PCP - General  Mari Arechiga MD as PCP - Family Medicine  Annel Mensah MD as PCP - [facility]   Hubert Crawford MD - urology    Smoking Status:  Social History     Tobacco Use   Smoking Status Former Smoker   Smokeless Tobacco Never Used   Tobacco Comment    Quit 2007       Alcohol Consumption:  Social History     Substance and Sexual Activity   Alcohol Use Not Currently   • Frequency: Monthly or less       Depression Screen:   PHQ-2/PHQ-9 Depression Screening 3/2/2020   Little interest or pleasure in doing things 0   Feeling down, depressed, or hopeless 0   Trouble falling or staying asleep, or sleeping too much 0   Feeling tired or having little energy 0   Poor appetite or overeating 0   Feeling bad about yourself - or that you are a failure or have let yourself or your family down 0   Trouble concentrating on things, such as reading the newspaper or watching television 0    Moving or speaking so slowly that other people could have noticed. Or the opposite - being so fidgety or restless that you have been moving around a lot more than usual 0   Thoughts that you would be better off dead, or of hurting yourself in some way 0   Total Score 0   If you checked off any problems, how difficult have these problems made it for you to do your work, take care of things at home, or get along with other people? Not difficult at all       Fall Risk Screen:  DAYANARA Fall Risk Assessment was completed, and patient is at LOW risk for falls.Assessment completed on:3/2/2020    Health Habits and Functional and Cognitive Screening:  Functional & Cognitive Status 3/2/2020   Do you have difficulty preparing food and eating? No   Do you have difficulty bathing yourself, getting dressed or grooming yourself? No   Do you have difficulty using the toilet? No   Do you have difficulty moving around from place to place? No   Do you have trouble with steps or getting out of a bed or a chair? No   Current Diet Well Balanced Diet   Do you need help using the phone?  No   Are you deaf or do you have serious difficulty hearing?  Yes   Do you need help with transportation? No   Do you need help shopping? No   Do you need help preparing meals?  No   Do you need help with housework?  No   Do you need help with laundry? No   Do you need help taking your medications? No   Do you need help managing money? No   Do you ever drive or ride in a car without wearing a seat belt? No   Have you felt unusual stress, anger or loneliness in the last month? No   Who do you live with? Spouse   If you need help, do you have trouble finding someone available to you? No   Do you have difficulty concentrating, remembering or making decisions? No     Flowsheet was completed by medical assistant.  Data for functional and cognitive status did not save in chart.  ACE III Mini score was 24 out of 30.  Functional and cognitive status completed by  MD after visit based on our conversation during his visit.    Does the patient have evidence of cognitive impairment? No    Asprin use counseling:Taking ASA appropriately as indicated    Age-appropriate Screening Schedule:  Refer to the list below for future screening recommendations based on patient's age, sex and/or medical conditions. Orders for these recommended tests are listed in the plan section. The patient has been provided with a written plan.    Health Maintenance   Topic Date Due   • TDAP/TD VACCINES (1 - Tdap) 01/10/1961   • ZOSTER VACCINE (1 of 2) 01/10/2002   • COLONOSCOPY  09/26/2020   • INFLUENZA VACCINE  Addressed          The following portions of the patient's history were reviewed and updated as appropriate: allergies, current medications, past family history, past medical history, past social history, past surgical history and problem list.    Outpatient Medications Prior to Visit   Medication Sig Dispense Refill   • aspirin 81 MG EC tablet Take 81 mg by mouth Daily.     • lisinopril-hydrochlorothiazide (PRINZIDE,ZESTORETIC) 20-12.5 MG per tablet Take 1 tablet by mouth Daily. 90 tablet 3   • Multiple Vitamins-Minerals (DAILY MULTIVITAMIN) capsule DAILY MULTIVITAMIN CAPS           Patient Active Problem List   Diagnosis   • Other dorsalgia   • Hypertension   • Enlarged prostate without lower urinary tract symptoms (luts)   • Encounter for screening for malignant neoplasm of prostate   • History of hepatitis C   • Obstructive sleep apnea   • Full code status       Advanced Care Planning:  ACP discussion was held with the patient during this visit. Patient does not have an advance directive, information provided.  We discussed his advanced directives today.  He does not have any signed documents.  Last discussion on advanced directives was at his Medicare wellness visit on 2/27/2019.  Patient has designated his wife (Emperatriz Monte) as his medical designee.  He does not have a formal medical power  of , however.  Patient has made the decision to be a full code.  However, he would not want any medically unnecessary treatments which would prolong the dying process.  Patient was given a copy of the [place] living will form, [place] medical designee form, and [place] out of hospital DNR form for his review.  He was asked to return these documents to the office to upload into his medical record if he chose to sign them.  Time spent counseling greater than 16 minutes.      Review of Systems   Constitutional: Negative for activity change, appetite change, chills, fever and unexpected weight change.   HENT: Positive for hearing loss (Chronic, stable). Negative for congestion, ear pain, sore throat and trouble swallowing.    Eyes: Negative for blurred vision, pain, redness and visual disturbance.   Respiratory: Negative for cough, shortness of breath and wheezing.    Cardiovascular: Negative for chest pain, palpitations, orthopnea and leg swelling.   Gastrointestinal: Negative for abdominal pain, blood in stool, constipation, diarrhea and vomiting.   Genitourinary: Negative for difficulty urinating, dysuria, hematuria and testicular pain.   Musculoskeletal: Negative for gait problem and neck stiffness.   Skin: Negative for rash.        No wounds.  He will soon be seeing dermatology for skin check.   Allergic/Immunologic: Negative for immunocompromised state.   Neurological: Negative for seizures, syncope, facial asymmetry, speech difficulty, weakness and headaches.   Hematological: Negative for adenopathy. Bruises/bleeds easily.   Psychiatric/Behavioral: Negative for confusion, hallucinations, sleep disturbance and suicidal ideas.       Compared to one year ago, the patient feels his physical health is better.  Compared to one year ago, the patient feels his mental health is the same.    Reviewed chart for potential of high risk medication in the elderly: yes  Reviewed chart for potential of harmful drug  "interactions in the elderly:yes    Objective         Vitals:    03/02/20 0812   BP: 127/73   Pulse: 57   Temp: 97.7 °F (36.5 °C)   TempSrc: Oral   SpO2: 97%   Weight: 93.7 kg (206 lb 9.6 oz)   Height: 177.8 cm (70\")       Body mass index is 29.64 kg/m².  Discussed the patient's BMI with him. The BMI is above average; BMI management plan is completed.    Physical Exam   Constitutional: He is oriented to person, place, and time. He appears well-developed and well-nourished. No distress.   HENT:   Head: Normocephalic and atraumatic.   Right Ear: External ear and ear canal normal. Tympanic membrane is not erythematous and not bulging. Tympanic membrane mobility is normal.   Left Ear: External ear and ear canal normal. Tympanic membrane is not erythematous and not bulging. Tympanic membrane mobility is normal.   Mouth/Throat: Oropharynx is clear and moist. No oropharyngeal exudate.   Eyes: Pupils are equal, round, and reactive to light. Conjunctivae and EOM are normal. Right eye exhibits no discharge. Left eye exhibits no discharge. No scleral icterus.   Neck: Normal range of motion. Neck supple. No JVD present.   Cardiovascular: Normal rate, regular rhythm, normal heart sounds and intact distal pulses.   No murmur heard.  Pulses:       Radial pulses are 2+ on the right side, and 2+ on the left side.        Posterior tibial pulses are 2+ on the right side, and 2+ on the left side.   Pulmonary/Chest: Effort normal and breath sounds normal. He has no wheezes. He has no rales.   Abdominal: Soft. Bowel sounds are normal. There is no tenderness. There is no rebound and no guarding.   Genitourinary:   Genitourinary Comments: Exam not performed   Musculoskeletal: Normal range of motion. He exhibits no edema.   Lymphadenopathy:     He has no cervical adenopathy.   Neurological: He is alert and oriented to person, place, and time. He displays normal reflexes. He exhibits normal muscle tone.   Skin: Skin is warm. Capillary refill " takes less than 2 seconds. No rash noted. No erythema.   Psychiatric: He has a normal mood and affect. His behavior is normal. Judgment and thought content normal.             Assessment/Plan   Medicare Risks and Personalized Health Plan  CMS Preventative Services Quick Reference  Abdominal Aortic Aneurysm Screening -screening scheduled  Advance Directive Discussion -see discussion above  Cardiovascular risk -fasting labs ordered.  [facility]  Colon Cancer Screening -colorectal screening is current until later this year.  Next colonoscopy due 9/26/2020.  Dementia/Memory -screening as above.  Patient did have some difficulty recalling the address.  However, as separate recall question was asked, and patient completed without any difficulty.  He denies any concerns about his memory.  Depression/Dysphoria -screening negative  Diabetic Lab Screening -fasting labs ordered  Fall Risk -see assessment above  Hearing Problem -he is chronic hearing loss and wears hearing aids, he has no new concerns   Immunizations Discussed/Encouraged (specific immunizations; adacel Tdap, Pneumococcal 23 and Shingrix ) - -he was directed to the pharmacy for Tdap and Shingrix vaccinations.  Pneumonia vaccine was updated today.  Obesity/Overweight   Prostate Cancer Screening -this is conducted by his urologist.    The above risks/problems have been discussed with the patient.  Pertinent information has been shared with the patient in the After Visit Summary.  Follow up plans and orders are seen below in the Assessment/Plan Section.      Diagnoses and all orders for this visit:    1. Medicare annual wellness visit, subsequent (Primary)    2. Encounter for screening for malignant neoplasm of prostate  Comments:  Managed by specialist.  PSA is current and available for review in the chart.    3. Essential hypertension  Comments:  Controlled.  Fasting labs ordered.  Continue current medications.  Orders:  -      Comprehensive Metabolic Panel  -     Lipid Panel  -     CBC & Differential  -     Cancel: POC Urinalysis Dipstick, Multipro    4. Full code status    5. Obstructive sleep apnea  Comments:  He reports compliance with CPAP usage.    6. Pneumococcal vaccination given  -     Pneumococcal Polysaccharide Vaccine 23-Valent (PPSV23) Greater Than or Equal To 1yo Subcutaneous / IM    7. Personal history of tobacco use  Comments:  He is agreeable to AAA screen.  Orders:  -     [place] aaa screen limited; Future    Other orders  -     Cancel: PSA Screen      Follow Up:  Return if symptoms worsen or fail to improve.     An After Visit Summary and PPPS were given to the patient.

## 2020-03-03 ENCOUNTER — HOSPITAL ENCOUNTER (OUTPATIENT)
Dept: ULTRASOUND IMAGING | Facility: HOSPITAL | Age: 70
Discharge: HOME | End: 2020-03-03
Admitting: FAMILY MEDICINE

## 2020-03-03 DIAGNOSIS — Z87.891 PERSONAL HISTORY OF TOBACCO USE: ICD-10-CM

## 2020-03-03 DIAGNOSIS — Z78.9 FULL CODE STATUS: ICD-10-CM

## 2020-03-03 DIAGNOSIS — Z12.5 ENCOUNTER FOR SCREENING FOR MALIGNANT NEOPLASM OF PROSTATE: ICD-10-CM

## 2020-03-03 PROCEDURE — 76706 US ABDL AORTA SCREEN AAA: CPT

## 2020-03-04 LAB
ALBUMIN SERPL-MCNC: 4.7 G/DL (ref 3.8–4.8)
ALBUMIN/GLOB SERPL: 2 {RATIO} (ref 1.2–2.2)
ALP SERPL-CCNC: 66 IU/L (ref 39–117)
ALT SERPL-CCNC: 19 IU/L (ref 0–44)
AST SERPL-CCNC: 19 IU/L (ref 0–40)
BASOPHILS # BLD AUTO: 0 X10E3/UL (ref 0–0.2)
BASOPHILS NFR BLD AUTO: 0 %
BILIRUB SERPL-MCNC: 0.4 MG/DL (ref 0–1.2)
BUN SERPL-MCNC: 26 MG/DL (ref 8–27)
BUN/CREAT SERPL: 21 (ref 10–24)
CALCIUM SERPL-MCNC: 9.9 MG/DL (ref 8.6–10.2)
CHLORIDE SERPL-SCNC: 99 MMOL/L (ref 96–106)
CHOLEST SERPL-MCNC: 180 MG/DL (ref 100–199)
CO2 SERPL-SCNC: 24 MMOL/L (ref 20–29)
CREAT SERPL-MCNC: 1.23 MG/DL (ref 0.76–1.27)
EOSINOPHIL # BLD AUTO: 0.2 X10E3/UL (ref 0–0.4)
EOSINOPHIL NFR BLD AUTO: 2 %
ERYTHROCYTE [DISTWIDTH] IN BLOOD BY AUTOMATED COUNT: 14.3 % (ref 11.6–15.4)
GLOBULIN SER CALC-MCNC: 2.3 G/DL (ref 1.5–4.5)
GLUCOSE SERPL-MCNC: 88 MG/DL (ref 65–99)
HCT VFR BLD AUTO: 42.4 % (ref 37.5–51)
HDLC SERPL-MCNC: 55 MG/DL
HGB BLD-MCNC: 14 G/DL (ref 13–17.7)
IMM GRANULOCYTES # BLD AUTO: 0 X10E3/UL (ref 0–0.1)
IMM GRANULOCYTES NFR BLD AUTO: 0 %
LDLC SERPL CALC-MCNC: 96 MG/DL (ref 0–99)
LYMPHOCYTES # BLD AUTO: 3.1 X10E3/UL (ref 0.7–3.1)
LYMPHOCYTES NFR BLD AUTO: 28 %
MCH RBC QN AUTO: 30.1 PG (ref 26.6–33)
MCHC RBC AUTO-ENTMCNC: 33 G/DL (ref 31.5–35.7)
MCV RBC AUTO: 91 FL (ref 79–97)
MONOCYTES # BLD AUTO: 0.7 X10E3/UL (ref 0.1–0.9)
MONOCYTES NFR BLD AUTO: 6 %
NEUTROPHILS # BLD AUTO: 7.1 X10E3/UL (ref 1.4–7)
NEUTROPHILS NFR BLD AUTO: 64 %
PLATELET # BLD AUTO: 321 X10E3/UL (ref 150–450)
POTASSIUM SERPL-SCNC: 4.5 MMOL/L (ref 3.5–5.2)
PROT SERPL-MCNC: 7 G/DL (ref 6–8.5)
RBC # BLD AUTO: 4.65 X10E6/UL (ref 4.14–5.8)
SODIUM SERPL-SCNC: 138 MMOL/L (ref 134–144)
TRIGL SERPL-MCNC: 144 MG/DL (ref 0–149)
VLDLC SERPL CALC-MCNC: 29 MG/DL (ref 5–40)
WBC # BLD AUTO: 11.1 X10E3/UL (ref 3.4–10.8)

## 2020-08-21 ENCOUNTER — TELEPHONE (OUTPATIENT)
Dept: FAMILY MEDICINE CLINIC | Facility: CLINIC | Age: 70
End: 2020-08-21

## 2020-08-21 NOTE — TELEPHONE ENCOUNTER
"Patient called stating for the last 6 days he has had a fever, feeling weak and tired as well as a cough patient did go yesterday and got tested for covid.\" Just wanted to informed my pcp what was going on with me.\"  "

## 2020-08-24 NOTE — TELEPHONE ENCOUNTER
Patient called stating that his covid testing came back positive and does have a fever cough headache and is staying home for 14 days

## 2020-08-26 ENCOUNTER — OFFICE VISIT (OUTPATIENT)
Dept: FAMILY MEDICINE CLINIC | Facility: CLINIC | Age: 70
End: 2020-08-26

## 2020-08-26 ENCOUNTER — HOSPITAL ENCOUNTER (EMERGENCY)
Facility: HOSPITAL | Age: 70
Discharge: HOME | End: 2020-08-26
Admitting: EMERGENCY MEDICINE

## 2020-08-26 ENCOUNTER — APPOINTMENT (OUTPATIENT)
Dept: GENERAL RADIOLOGY | Facility: HOSPITAL | Age: 70
End: 2020-08-26

## 2020-08-26 VITALS
TEMPERATURE: 98.2 F | RESPIRATION RATE: 19 BRPM | SYSTOLIC BLOOD PRESSURE: 121 MMHG | DIASTOLIC BLOOD PRESSURE: 69 MMHG | HEART RATE: 78 BPM | HEIGHT: 71 IN | WEIGHT: 196 LBS | OXYGEN SATURATION: 100 % | BODY MASS INDEX: 27.44 KG/M2

## 2020-08-26 DIAGNOSIS — R53.1 WEAKNESS: ICD-10-CM

## 2020-08-26 DIAGNOSIS — U07.1 CLINICAL DIAGNOSIS OF SEVERE ACUTE RESPIRATORY SYNDROME CORONAVIRUS 2 (SARS-COV-2) DISEASE: Primary | ICD-10-CM

## 2020-08-26 DIAGNOSIS — B34.2 CORONAVIRUS INFECTION: Primary | ICD-10-CM

## 2020-08-26 DIAGNOSIS — R06.02 SHORTNESS OF BREATH: ICD-10-CM

## 2020-08-26 LAB
ALBUMIN SERPL-MCNC: 3.9 G/DL (ref 3.5–5.2)
ALBUMIN/GLOB SERPL: 1.3 G/DL
ALP SERPL-CCNC: 61 U/L (ref 39–117)
ALT SERPL W P-5'-P-CCNC: 15 U/L (ref 1–41)
ANION GAP SERPL CALCULATED.3IONS-SCNC: 15 MMOL/L (ref 5–15)
AST SERPL-CCNC: 25 U/L (ref 1–40)
BASOPHILS # BLD AUTO: 0 10*3/MM3 (ref 0–0.2)
BASOPHILS NFR BLD AUTO: 0.4 % (ref 0–1.5)
BILIRUB SERPL-MCNC: 0.4 MG/DL (ref 0–1.2)
BUN SERPL-MCNC: 23 MG/DL (ref 8–23)
BUN SERPL-MCNC: ABNORMAL MG/DL
BUN/CREAT SERPL: ABNORMAL
CALCIUM SPEC-SCNC: 8.4 MG/DL (ref 8.6–10.5)
CHLORIDE SERPL-SCNC: 93 MMOL/L (ref 98–107)
CO2 SERPL-SCNC: 24 MMOL/L (ref 22–29)
CREAT SERPL-MCNC: 1.48 MG/DL (ref 0.76–1.27)
DEPRECATED RDW RBC AUTO: 42 FL (ref 37–54)
EOSINOPHIL # BLD AUTO: 0 10*3/MM3 (ref 0–0.4)
EOSINOPHIL NFR BLD AUTO: 0.2 % (ref 0.3–6.2)
ERYTHROCYTE [DISTWIDTH] IN BLOOD BY AUTOMATED COUNT: 13.7 % (ref 12.3–15.4)
GFR SERPL CREATININE-BSD FRML MDRD: 47 ML/MIN/1.73
GLOBULIN UR ELPH-MCNC: 2.9 GM/DL
GLUCOSE SERPL-MCNC: 100 MG/DL (ref 65–99)
HCT VFR BLD AUTO: 37.7 % (ref 37.5–51)
HGB BLD-MCNC: 13.1 G/DL (ref 13–17.7)
INR PPP: 0.99 (ref 0.93–1.1)
LYMPHOCYTES # BLD AUTO: 1.4 10*3/MM3 (ref 0.7–3.1)
LYMPHOCYTES NFR BLD AUTO: 16.9 % (ref 19.6–45.3)
MCH RBC QN AUTO: 30.8 PG (ref 26.6–33)
MCHC RBC AUTO-ENTMCNC: 34.8 G/DL (ref 31.5–35.7)
MCV RBC AUTO: 88.7 FL (ref 79–97)
MONOCYTES # BLD AUTO: 0.5 10*3/MM3 (ref 0.1–0.9)
MONOCYTES NFR BLD AUTO: 6.2 % (ref 5–12)
NEUTROPHILS NFR BLD AUTO: 6.4 10*3/MM3 (ref 1.7–7)
NEUTROPHILS NFR BLD AUTO: 76.3 % (ref 42.7–76)
NRBC BLD AUTO-RTO: 0.1 /100 WBC (ref 0–0.2)
PLATELET # BLD AUTO: 303 10*3/MM3 (ref 140–450)
PMV BLD AUTO: 8.3 FL (ref 6–12)
POTASSIUM SERPL-SCNC: 4.1 MMOL/L (ref 3.5–5.2)
PROT SERPL-MCNC: 6.8 G/DL (ref 6–8.5)
PROTHROMBIN TIME: 10.7 SECONDS (ref 9.6–11.7)
RBC # BLD AUTO: 4.25 10*6/MM3 (ref 4.14–5.8)
SODIUM SERPL-SCNC: 132 MMOL/L (ref 136–145)
WBC # BLD AUTO: 8.4 10*3/MM3 (ref 3.4–10.8)

## 2020-08-26 PROCEDURE — 93005 ELECTROCARDIOGRAM TRACING: CPT

## 2020-08-26 PROCEDURE — 96360 HYDRATION IV INFUSION INIT: CPT

## 2020-08-26 PROCEDURE — 85610 PROTHROMBIN TIME: CPT | Performed by: NURSE PRACTITIONER

## 2020-08-26 PROCEDURE — 99283 EMERGENCY DEPT VISIT LOW MDM: CPT

## 2020-08-26 PROCEDURE — 71045 X-RAY EXAM CHEST 1 VIEW: CPT

## 2020-08-26 PROCEDURE — 80053 COMPREHEN METABOLIC PANEL: CPT | Performed by: NURSE PRACTITIONER

## 2020-08-26 PROCEDURE — 99442 PR PHYS/QHP TELEPHONE EVALUATION 11-20 MIN: CPT | Performed by: FAMILY MEDICINE

## 2020-08-26 PROCEDURE — 85025 COMPLETE CBC W/AUTO DIFF WBC: CPT | Performed by: NURSE PRACTITIONER

## 2020-08-26 RX ORDER — SODIUM CHLORIDE 0.9 % (FLUSH) 0.9 %
10 SYRINGE (ML) INJECTION AS NEEDED
Status: DISCONTINUED | OUTPATIENT
Start: 2020-08-26 | End: 2020-08-26 | Stop reason: HOSPADM

## 2020-08-26 RX ADMIN — SODIUM CHLORIDE 1000 ML: 0.9 INJECTION, SOLUTION INTRAVENOUS at 18:30

## 2020-08-26 NOTE — ED PROVIDER NOTES
Subjective   Patient is a 70-year-old male who was diagnosed with COVID-19 last Thursday.  Through primary care he states that he is not having any shortness of breath he just feels weak-and states he has had decreased oral intake due to his weakness.  He states he lives at home with his wife but is keeping himself quarantine in the back bedroom.  He denies pain.  He states he has not had fever for several days.          Review of Systems   Constitutional: Positive for activity change and appetite change. Negative for chills, fatigue and fever.   HENT: Negative for congestion, tinnitus and trouble swallowing.    Eyes: Negative for photophobia, discharge and redness.   Respiratory: Negative for cough and shortness of breath.    Cardiovascular: Negative for chest pain and palpitations.   Gastrointestinal: Negative for abdominal pain, diarrhea, nausea and vomiting.   Genitourinary: Negative for dysuria, frequency and urgency.   Musculoskeletal: Negative for back pain, joint swelling and myalgias.   Skin: Negative for rash.   Neurological: Positive for weakness. Negative for dizziness and headaches.   Psychiatric/Behavioral: Negative for confusion.   All other systems reviewed and are negative.      Past Medical History:   Diagnosis Date   • Acute non-recurrent frontal sinusitis    • Advanced care planning/counseling discussion    • Allergic rhinitis due to pollen    • BPH (benign prostatic hyperplasia)    • Essential hypertension    • Fever and chills    • Hearing disorder    • Hepatitis C    • History of tobacco use    • Hypertension    • Snoring        No Known Allergies    Past Surgical History:   Procedure Laterality Date   • OTHER SURGICAL HISTORY  06/2011    Turp enlarged prostate       Family History   Problem Relation Age of Onset   • Coronary artery disease Other         family members in general   • Hypertension Other         family members in general       Social History     Socioeconomic History   • Marital  "status:      Spouse name: Not on file   • Number of children: Not on file   • Years of education: Not on file   • Highest education level: Not on file   Occupational History   • Occupation: Retired   Tobacco Use   • Smoking status: Former Smoker   • Smokeless tobacco: Never Used   • Tobacco comment: Quit 2007   Substance and Sexual Activity   • Alcohol use: Not Currently     Frequency: Monthly or less   • Drug use: Not Currently   • Sexual activity: Defer           Objective   Physical Exam   Constitutional: He is oriented to person, place, and time. He appears well-developed and well-nourished.   HENT:   Head: Normocephalic and atraumatic.   Mouth/Throat: Oropharynx is clear and moist.   Eyes: Pupils are equal, round, and reactive to light. Conjunctivae and EOM are normal.   Neck: Normal range of motion. Neck supple.   Cardiovascular: Normal rate, regular rhythm, normal heart sounds and intact distal pulses.   Pulmonary/Chest: Effort normal and breath sounds normal.   Abdominal: Soft. Bowel sounds are normal.   Musculoskeletal: Normal range of motion.        Right lower leg: Normal.        Left lower leg: Normal.   Neurological: He is alert and oriented to person, place, and time. GCS eye subscore is 4. GCS verbal subscore is 5. GCS motor subscore is 6.   Skin: Skin is warm and dry.   Psychiatric: He has a normal mood and affect. His behavior is normal.   Vitals reviewed.      Procedures  EKG shows sinus rhythm with a rate of 64 no ectopy no ST elevation reviewed by myself read by Dr. Otto Landaverde         ED Course      /76 (BP Location: Left arm, Patient Position: Sitting)   Pulse 69   Temp 98.3 °F (36.8 °C) (Oral)   Resp 24   Ht 180.3 cm (71\")   Wt 88.9 kg (196 lb)   SpO2 100%   BMI 27.34 kg/m²   Labs Reviewed   COMPREHENSIVE METABOLIC PANEL - Abnormal; Notable for the following components:       Result Value    Glucose 100 (*)     Creatinine 1.48 (*)     Sodium 132 (*)     Chloride 93 (*)     " Calcium 8.4 (*)     eGFR Non  Amer 47 (*)     All other components within normal limits    Narrative:     GFR Normal >60  Chronic Kidney Disease <60  Kidney Failure <15     CBC WITH AUTO DIFFERENTIAL - Abnormal; Notable for the following components:    Neutrophil % 76.3 (*)     Lymphocyte % 16.9 (*)     Eosinophil % 0.2 (*)     All other components within normal limits   PROTIME-INR - Normal   BUN - Normal   CBC AND DIFFERENTIAL    Narrative:     The following orders were created for panel order CBC & Differential.  Procedure                               Abnormality         Status                     ---------                               -----------         ------                     CBC Auto Differential[[id number]]        Abnormal            Final result                 Please view results for these tests on the individual orders.     Medications   sodium chloride 0.9 % flush 10 mL (has no administration in time range)   sodium chloride 0.9 % bolus 1,000 mL (1,000 mL Intravenous New Bag 8/26/20 1830)     Xr Chest 1 View    Result Date: 8/26/2020  Mild interstitial prominence which can be seen with an atypical infection such as Covid 19.  Electronically Signed By-Ze Montilla On:8/26/2020 6:46 PM This report was finalized on [id number] by  Ze Montilla, .                                         MDM  Number of Diagnoses or Management Options  Clinical diagnosis of severe acute respiratory syndrome coronavirus 2 (SARS-CoV-2) disease:   Weakness:   Diagnosis management comments: Appropriate PPE was used in the evaluation of this patient that his COVID positive.--Patient had IV established and blood work was obtained patient was given 1 L of normal saline and EKG and chest x-ray were obtained-EKG was consistent with COVID-19 which patient already had positive results.  Blood work was obtained and remained stable patient had an N 95 on without oxygen usage throughout his emergency room stay and his oxygen  saturation stayed 100%.  He was given 1 L of fluids and states he feels better.  He will be discharged home to follow-up with primary care or return if worse he verbalized understood discharge instructions       Amount and/or Complexity of Data Reviewed  Clinical lab tests: reviewed  Tests in the radiology section of CPT®: reviewed  Tests in the medicine section of CPT®: reviewed    Patient Progress  Patient progress: improved      Final diagnoses:   Clinical diagnosis of severe acute respiratory syndrome coronavirus 2 (SARS-CoV-2) disease   Weakness            Tracy Berg, APRN  08/26/20 1956

## 2020-08-26 NOTE — PROGRESS NOTES
Chief Complaint   Patient presents with   • Shortness of Breath     COVID-19 positive   • Fatigue       You have chosen to receive care through a telephone visit. Do you consent to use a telephone visit for your medical care today? Yes      Subjective   Jaret Monte is a 70 y.o. male.     History of Present Illness   Patient was recently diagnosed with COVID-19.  Swab done 8/20/2020 through [facility].  He developed symptoms 3 days prior to testing.  Illness present for 9 days total.   He has not yet seen a physician.  Visit today to discuss symptoms.  He reports still being very weak.  He reports shortness of breath that has been stable (but wife indicates that he is as well as he lets on over the phone).  He is short of breath with rest, with mildly productive cough . Because he has been short of breath, he has been using his CPAP machine to help him sleep.  He reports no vomiting or diarrhea and is trying to stay hydrated.  Current treatment includes tylenol for fever (fever has been 99+ over the past few days).      I have reviewed and updated his medications, medical history and problem list during today's office visit.       Past Medical History :  Active Ambulatory Problems     Diagnosis Date Noted   • Other dorsalgia 12/07/2012   • Hypertension 01/02/2020   • Enlarged prostate without lower urinary tract symptoms (luts) 02/20/2017   • Encounter for screening for malignant neoplasm of prostate 07/28/2016   • History of hepatitis C 12/07/2012   • Obstructive sleep apnea 03/02/2020   • Full code status 03/02/2020     Resolved Ambulatory Problems     Diagnosis Date Noted   • No Resolved Ambulatory Problems     Past Medical History:   Diagnosis Date   • Acute non-recurrent frontal sinusitis    • Advanced care planning/counseling discussion    • Allergic rhinitis due to pollen    • BPH (benign prostatic hyperplasia)    • Essential hypertension    • Fever and chills    • Hearing disorder    • Hepatitis C    •  History of tobacco use    • Snoring        Medication List:    Current Outpatient Medications:   •  aspirin 81 MG EC tablet, Take 81 mg by mouth Daily., Disp: , Rfl:   •  lisinopril-hydrochlorothiazide (PRINZIDE,ZESTORETIC) 20-12.5 MG per tablet, Take 1 tablet by mouth Daily., Disp: 90 tablet, Rfl: 3  •  Multiple Vitamins-Minerals (DAILY MULTIVITAMIN) capsule, DAILY MULTIVITAMIN CAPS, Disp: , Rfl:     No Known Allergies      Social History     Tobacco Use   • Smoking status: Former Smoker   • Smokeless tobacco: Never Used   • Tobacco comment: Quit 2007   Substance Use Topics   • Alcohol use: Not Currently     Frequency: Monthly or less       Review of Systems   Constitutional: Positive for activity change, appetite change (loss), fatigue and fever.   HENT:        Nothing tastes good   Respiratory: Positive for cough (mildly productive) and shortness of breath.    Gastrointestinal: Negative for abdominal pain, diarrhea and vomiting.   Neurological: Positive for weakness. Negative for syncope.           Assessment/Plan     Diagnoses and all orders for this visit:    1. Coronavirus infection (Primary)    2. Weakness    3. Shortness of breath    Given symptoms (shortness of breath, weakness), lack of symptom improvement since diagnosis, and not having been evaluated by a provider yet, Mr. Monte was advised to present to [facility] ER for evaluation.  He is agreeable to going.  Report called by myself to [facility] ER.    Return if symptoms worsen or fail to improve.         Length of Telephone Visit: 18 minutes (5:05 - 5:23 pm).

## 2020-08-26 NOTE — ED NOTES
Patient was diagnosed with Covid 4 days ago. Back today for increased weakness and SOA     Radha Snell RN  08/26/20 [id number]

## 2020-08-26 NOTE — DISCHARGE INSTRUCTIONS
Continue to quarantine for the next 10 days.  Push clear liquids use Tylenol and Motrin as needed for discomfort and fever and return for any worsening symptoms shortness of breath or other worries.

## 2020-08-31 ENCOUNTER — OFFICE VISIT (OUTPATIENT)
Dept: FAMILY MEDICINE CLINIC | Facility: CLINIC | Age: 70
End: 2020-08-31

## 2020-08-31 DIAGNOSIS — B34.2 CORONAVIRUS INFECTION: Primary | ICD-10-CM

## 2020-08-31 PROCEDURE — 99442 PR PHYS/QHP TELEPHONE EVALUATION 11-20 MIN: CPT | Performed by: FAMILY MEDICINE

## 2020-09-29 ENCOUNTER — ON CAMPUS - OUTPATIENT (AMBULATORY)
Dept: URBAN - METROPOLITAN AREA HOSPITAL 2 | Facility: HOSPITAL | Age: 70
End: 2020-09-29
Payer: MEDICARE

## 2020-09-29 ENCOUNTER — OFFICE (AMBULATORY)
Dept: URBAN - METROPOLITAN AREA PATHOLOGY 4 | Facility: PATHOLOGY | Age: 70
End: 2020-09-29
Payer: COMMERCIAL

## 2020-09-29 ENCOUNTER — OFFICE (AMBULATORY)
Dept: URBAN - METROPOLITAN AREA PATHOLOGY 4 | Facility: PATHOLOGY | Age: 70
End: 2020-09-29
Payer: MEDICARE

## 2020-09-29 VITALS
TEMPERATURE: 97.1 F | WEIGHT: 192 LBS | RESPIRATION RATE: 15 BRPM | HEIGHT: 70 IN | DIASTOLIC BLOOD PRESSURE: 64 MMHG | SYSTOLIC BLOOD PRESSURE: 135 MMHG | RESPIRATION RATE: 16 BRPM | RESPIRATION RATE: 18 BRPM | HEART RATE: 56 BPM | OXYGEN SATURATION: 97 % | SYSTOLIC BLOOD PRESSURE: 100 MMHG | HEART RATE: 63 BPM | DIASTOLIC BLOOD PRESSURE: 69 MMHG | HEART RATE: 68 BPM | HEART RATE: 59 BPM | OXYGEN SATURATION: 96 % | OXYGEN SATURATION: 99 % | OXYGEN SATURATION: 98 % | OXYGEN SATURATION: 100 % | HEART RATE: 66 BPM | SYSTOLIC BLOOD PRESSURE: 125 MMHG | HEART RATE: 54 BPM | HEART RATE: 57 BPM | SYSTOLIC BLOOD PRESSURE: 124 MMHG | HEART RATE: 73 BPM | SYSTOLIC BLOOD PRESSURE: 131 MMHG | HEART RATE: 58 BPM | DIASTOLIC BLOOD PRESSURE: 79 MMHG | DIASTOLIC BLOOD PRESSURE: 54 MMHG | SYSTOLIC BLOOD PRESSURE: 115 MMHG | DIASTOLIC BLOOD PRESSURE: 82 MMHG | SYSTOLIC BLOOD PRESSURE: 127 MMHG | SYSTOLIC BLOOD PRESSURE: 105 MMHG | DIASTOLIC BLOOD PRESSURE: 75 MMHG | DIASTOLIC BLOOD PRESSURE: 72 MMHG

## 2020-09-29 DIAGNOSIS — K63.3 ULCER OF INTESTINE: ICD-10-CM

## 2020-09-29 DIAGNOSIS — D12.2 BENIGN NEOPLASM OF ASCENDING COLON: ICD-10-CM

## 2020-09-29 DIAGNOSIS — Z86.010 PERSONAL HISTORY OF COLONIC POLYPS: ICD-10-CM

## 2020-09-29 DIAGNOSIS — K64.1 SECOND DEGREE HEMORRHOIDS: ICD-10-CM

## 2020-09-29 PROBLEM — K63.89 OTHER SPECIFIED DISEASES OF INTESTINE: Status: ACTIVE | Noted: 2020-09-29

## 2020-09-29 PROBLEM — K63.5 POLYP OF COLON: Status: ACTIVE | Noted: 2020-09-29

## 2020-09-29 PROCEDURE — 45380 COLONOSCOPY AND BIOPSY: CPT | Mod: 59,PT | Performed by: INTERNAL MEDICINE

## 2020-09-29 PROCEDURE — 45385 COLONOSCOPY W/LESION REMOVAL: CPT | Mod: PT | Performed by: INTERNAL MEDICINE

## 2020-09-29 PROCEDURE — 88305 TISSUE EXAM BY PATHOLOGIST: CPT | Mod: 26 | Performed by: INTERNAL MEDICINE

## 2020-10-07 ENCOUNTER — OFFICE VISIT (OUTPATIENT)
Dept: SLEEP MEDICINE | Facility: HOSPITAL | Age: 70
End: 2020-10-07

## 2020-10-07 VITALS
WEIGHT: 192 LBS | SYSTOLIC BLOOD PRESSURE: 129 MMHG | DIASTOLIC BLOOD PRESSURE: 77 MMHG | HEART RATE: 54 BPM | HEIGHT: 70 IN | OXYGEN SATURATION: 100 % | BODY MASS INDEX: 27.49 KG/M2

## 2020-10-07 DIAGNOSIS — G47.33 OBSTRUCTIVE SLEEP APNEA, ADULT: Primary | ICD-10-CM

## 2020-10-07 PROCEDURE — G0463 HOSPITAL OUTPT CLINIC VISIT: HCPCS

## 2020-10-07 NOTE — PROGRESS NOTES
SLEEP MEDICINE CONSULT      Patient Identification:     Name: Jaret Monte   Age: 70 y.o.   Gender: male   : 1950   MRN: [id number]     Date of consult: 10/7/2020    PCP/Referring Physician(s):     PCP: Mari Arechiga MD  Referring Provider: Annel Mensah MD      Chief Complaint:   Obstructive sleep apnea  12 months follow-up for compliance after using the mask with given pressures..    History of Present Illness:   This is a very pleasant 70 years old  gentleman who underwent a nocturnal split-night polysomnogram for evaluation of obstructive sleep apnea.  He was provided with an auto BiPAP mode of therapy with a full facemask.  He is here today for  12 months follow-up for compliance .  Memory card has been downloaded.    He has used  Full face mask  which he has found  Some what uncomfortable. uncomfortable.  Fewer air leaks noted by him.  He has wear an  interface with the mask.    The pressures are quite tolerable to him but has occasionally has felt uncomfortable.  Uses humidifier on regular basis.    He has slept much better with the given pressures in the mask.  His bedtime nowadays is 8:30 PM.  Takes him approximately 15 minutes to doze off.  He usually wakes up at a 5 in the morning to start his day.  He does not wake up at night many times for a bathroom trip.  He denies any symptoms of headache or heartburn at night or in the morning.  He denies any symptoms of nasal congestion or dry mouth at night or in the morning.  He does not snore with the mask in place.    He has woken up in the morning awake alert and rested. He  remains active throughout the day. He  does not like any caffeinated beverages.  He has occasionally  taken  naps during the daytime.  [place] sleepiness score is 3/24.    Allergies, Medications, and Past History:   Allergies:    No Known Allergies  Current Medications:    Prior to Admission medications    Medication Sig Start Date End Date  Taking? Authorizing Provider   aspirin 81 MG EC tablet Take 81 mg by mouth Daily.   Yes Johanny Rocha MD   lisinopril-hydrochlorothiazide (PRINZIDE,ZESTORETIC) 20-12.5 MG per tablet Take 1 tablet by mouth Daily.   Yes ProviderJohanny MD     Past Medical History:    Past Medical History:   Diagnosis Date   • Acute non-recurrent frontal sinusitis    • Advanced care planning/counseling discussion    • Allergic rhinitis due to pollen    • BPH (benign prostatic hyperplasia)    • Essential hypertension    • Fever and chills    • Hearing disorder    • Hepatitis C    • History of tobacco use    • Hypertension    • Snoring       Past Surgical History:    Past Surgical History:   Procedure Laterality Date   • OTHER SURGICAL HISTORY  06/2011    Turp enlarged prostate      Social History:    Social History     Tobacco Use   • Smoking status: Former Smoker   • Smokeless tobacco: Never Used   • Tobacco comment: Quit 2007   Substance Use Topics   • Alcohol use: Not Currently     Frequency: Monthly or less   • Drug use: Not Currently     Family Medical History:  Family History   Problem Relation Age of Onset   • Coronary artery disease Other         family members in general   • Hypertension Other         family members in general         Review of Systems:   Constitutional:  Denies fever or chills and weight gain. He contracted covid-19 in august 2020. He remained quarantined at home of 2 weeks.  Eyes:  Denies change in visual acuity   HENT:  Denies nasal congestion, sore throat or post nasal drainage   Respiratory:  Denies cough, shortness of breath or dyspnea on exertion    Cardiovascular:  Denies chest pain or edema   GI:  Denies abdominal pain, nausea, vomiting, bloody stools or diarrhea   :  Denies dysuria or nocturia   Musculoskeletal:  Denies back pain or joint pain   Integument:  Denies rash   Neurologic:  Denies headache, focal weakness or sensory changes. No history of stroke or seizures.   Endocrine:   "Denies polyuria or polydipsia   Lymphatic:  Denies swollen glands   Psychiatric:  Denies depression, anxiety or claustrophobia      Physical Exam:     Vitals:    10/07/20 1101   BP: 129/77   BP Location: Right arm   Patient Position: Sitting   Cuff Size: Adult   Pulse: 54   SpO2: 100%   Weight: 87.1 kg (192 lb)   Height: 177.8 cm (70\")     HEENT: Head is normocephalic, atraumatic, normal distribution of hair. Pupils reactive to light. Ocular movements intact. No nasal congestion on sniff test. No deviated nasal septum. No micrognathia.  Throat: Mallapatti stage 4, tongue midline, mucosa moist.  Neck: no JVD, thyromegaly, mass, +midline trachea, No lymphadenopathy.  Lungs: clear, no wheeze, rhonchi, crackles  Cardiovascular: S1, S2, RRR no murmurs, rubs or gallops  Abd: +BS's soft NT/ND, no CVA tenderness.    Ext: no edema, cyanosis, clubbing, pulses intact  Neuro: Awake alert, oriented to time place and person. Grossly intact to motor, sensory cerebral, and cerebellar (without focal deficit)  Psych: No overt roxy, depression, psychosis or inappropriate behavior  Skin: No rash, cellulitis, or ulcerations.  No facial rash or erosions    DIAGNOSTIC DATA  Memory card download shows data from 10/8/2019 to 10/6/2020 .  overall 365 days of data reviewed 99.7% use noted.  Maximum hours used in hours 13 hours and 58 minutes.  Minimum time used  3 minutes.  99.5% of the time the mask has been used for more than 4 hours.  The average apnea hypopnia index is 1.4 events per hour an auto BiPAP mode of therapy with a pressure range between 12-22 CWP.  Pressure support between 4 and 6 CWP.    Assessment/Plan:   Obstructive sleep apnea:  This is a very pleasant gentleman who has been diagnosed with obstructive sleep apnea.  He uses an auto BiPAP mode of therapy with a full facemask.  He is compliant with therapy.  Is getting benefit from it.    The results of the memory card download were discussed in detail with the patient all " questions were answered.    He would like to decrease in  the auto BiPAP pressure.  Recommendations.  He is advised to continue using his mask with given pressures on a nightly basis.  Is advised to use the mask with given pressures for at least 4 hours for minimum benefit or as long as he sleeps a maximum benefit.  Is advised to use the mask if he takes a nap during the daytime.  To decrease the range of auto BiPAP mode of therapy to 10-20 CWP.  The pressure support range remains between 4-6 CWP.  Allergic rhinitis:  He has remained symptomatic.  Recommendation aggressive therapy is recommended.  He may benefit from allergy testing.  Driving instructions. Patient is advised to avoid driving if tired or somnolent. To avoid all alcoholic beverages or medications causing somnolence.         Diagnosis Plan   1. Obstructive sleep apnea, adult  CPAP Therapy     No orders of the defined types were placed in this encounter.    Orders Placed This Encounter   Procedures   • CPAP Therapy     1. Please try another variety of FFM. PATIENT USUALLY SLEEP IN A PRONE POSITION.    2. TO DECREASE THE PRESSURE RANGE OF auto-BIPAP  TO 10 TO 22 cwp. PRESSURE SUPPORT RANGE REMAINS 4 to 6 cwp,     Order Specific Question:   Equipment:     Answer:   PAP Supplies Only     Order Specific Question:   PAP Accessories     Answer:    Water Chamber for PAP Humidifier (1 per 6 month) &  Filter PAP Disposable (2 per month) &  Filter PAP Non-Disposable (1 per 6 months) &  Chinstrap to be used with PAP (1 per 6 months) &  Headgear to be used with PAP (1 per 6 mo)     Order Specific Question:   Does the patient have Obstructive Sleep Apnea or other qualifying diagnosis for PAP ordered?     Answer:   Yes     Order Specific Question:   Does the patient require humidification?     Answer:   Yes     Order Specific Question:   Humidifier     Answer:    Humidifier Heated for CPAP or BiPAP     Order Specific Question:   If  ordering a BiPAP for MAXIMO a CPAP has been tried and/or ruled out?     Answer:   Yes     Order Specific Question:   The patient requires a PAP Device & continued use of Supplies to administer effective PAP therapy at the frequency of use ordered above     Answer:   Yes     Order Specific Question:   Initial Supplies and refills authorized for 12 months?     Answer:   Yes     Order Specific Question:   Which [employer] company is this being sent to?     Answer:   [facility] [[id number]]     Order Specific Question:   Length of Need (99 Months = Lifetime)     Answer:   99 Months = Lifetime         Annel Mensah MD  10/7/2020  12:00 EDT

## 2021-02-07 DIAGNOSIS — I10 ESSENTIAL HYPERTENSION: ICD-10-CM

## 2021-02-07 RX ORDER — LISINOPRIL AND HYDROCHLOROTHIAZIDE 20; 12.5 MG/1; MG/1
1 TABLET ORAL DAILY
Qty: 90 TABLET | Refills: 3 | Status: SHIPPED | OUTPATIENT
Start: 2021-02-07 | End: 2022-01-27

## 2021-04-26 ENCOUNTER — OFFICE VISIT (OUTPATIENT)
Dept: FAMILY MEDICINE CLINIC | Facility: CLINIC | Age: 71
End: 2021-04-26

## 2021-04-26 VITALS
TEMPERATURE: 98.9 F | DIASTOLIC BLOOD PRESSURE: 68 MMHG | SYSTOLIC BLOOD PRESSURE: 136 MMHG | HEIGHT: 66 IN | OXYGEN SATURATION: 95 % | HEART RATE: 85 BPM | WEIGHT: 205.4 LBS | BODY MASS INDEX: 33.01 KG/M2

## 2021-04-26 DIAGNOSIS — Z00.00 MEDICARE ANNUAL WELLNESS VISIT, SUBSEQUENT: Primary | ICD-10-CM

## 2021-04-26 DIAGNOSIS — I10 ESSENTIAL HYPERTENSION: ICD-10-CM

## 2021-04-26 DIAGNOSIS — E78.5 HYPERLIPIDEMIA, UNSPECIFIED HYPERLIPIDEMIA TYPE: ICD-10-CM

## 2021-04-26 DIAGNOSIS — Z78.9 FULL CODE STATUS: ICD-10-CM

## 2021-04-26 DIAGNOSIS — E66.09 CLASS 1 OBESITY DUE TO EXCESS CALORIES WITH SERIOUS COMORBIDITY AND BODY MASS INDEX (BMI) OF 33.0 TO 33.9 IN ADULT: ICD-10-CM

## 2021-04-26 PROBLEM — R91.1 PULMONARY NODULE: Status: ACTIVE | Noted: 2021-04-26

## 2021-04-26 PROBLEM — Z87.891 FORMER SMOKER: Status: ACTIVE | Noted: 2021-04-26

## 2021-04-26 PROCEDURE — G0439 PPPS, SUBSEQ VISIT: HCPCS | Performed by: FAMILY MEDICINE

## 2021-04-26 RX ORDER — ATORVASTATIN CALCIUM 20 MG/1
20 TABLET, FILM COATED ORAL DAILY
COMMUNITY
Start: 2021-02-23

## 2021-04-26 NOTE — PATIENT INSTRUCTIONS
Medicare Wellness  Personal Prevention Plan of Service     Date of Office Visit:  2021  Encounter Provider:  Mari Arechiga MD  Place of Service:  [facility]  Patient Name: Jaret Monte  :  1950    As part of the Medicare Wellness portion of your visit today, we are providing you with this personalized preventive plan of services (PPPS). This plan is based upon recommendations of the [place] Preventive Services Task Force (USPSTF) and the Advisory Committee on Immunization Practices (ACIP).    This lists the preventive care services that should be considered, and provides dates of when you are due. Items listed as completed are up-to-date and do not require any further intervention.    Health Maintenance   Topic Date Due   • TDAP/TD VACCINES (1 - Tdap) Never done   • INFLUENZA VACCINE  2021   • ANNUAL WELLNESS VISIT  2022   • LIPID PANEL  2022   • COLONOSCOPY  2023   • COVID-19 Vaccine  Completed   • Pneumococcal Vaccine 65+  Completed   • AAA SCREEN (ONE-TIME)  Completed   • ZOSTER VACCINE  Completed   • Hepatitis B  Aged Out   • HEPATITIS C SCREENING  Discontinued       No orders of the defined types were placed in this encounter.      Return in about 1 year (around 2022) for Medicare Wellness.

## 2021-04-26 NOTE — PROGRESS NOTES
The ABCs of the Annual Wellness Visit  Subsequent Medicare Wellness Visit    Chief Complaint   Patient presents with   • Medicare Wellness-subsequent       Subjective   History of Present Illness:  Jaret Monte is a 71 y.o. male who presents for a Subsequent Medicare Wellness Visit.  [facility] and hyper[facility].  Patient reports his lipids were recently [facility].   He is taking atorvastatin 20 mg and is tolerating medication.  He has no medication concerns today.    HEALTH RISK ASSESSMENT    Recent Hospitalizations:  No hospitalization(s) within the last year.    Current Medical Providers:  Patient Care Team:  Mari Arechiga MD as PCP - General  Mari Arechiga MD as PCP - Family Medicine    Smoking Status:  Social History     Tobacco Use   Smoking Status Former Smoker   Smokeless Tobacco Never Used   Tobacco Comment    Quit 2007       Alcohol Consumption:  Social History     Substance and Sexual Activity   Alcohol Use Not Currently       Depression Screen:   PHQ-2/PHQ-9 Depression Screening 4/26/2021   Little interest or pleasure in doing things 0   Feeling down, depressed, or hopeless 0   Trouble falling or staying asleep, or sleeping too much -   Feeling tired or having little energy -   Poor appetite or overeating -   Feeling bad about yourself - or that you are a failure or have let yourself or your family down -   Trouble concentrating on things, such as reading the newspaper or watching television -   Moving or speaking so slowly that other people could have noticed. Or the opposite - being so fidgety or restless that you have been moving around a lot more than usual -   Thoughts that you would be better off dead, or of hurting yourself in some way -   Total Score 0   If you checked off any problems, how difficult have these problems made it for you to do your work, take care of things at home, or get  along with other people? -       Fall Risk Screen:  DAYANARA Fall Risk Assessment was completed, and patient is at LOW risk for falls.Assessment completed on:4/26/2021    Health Habits and Functional and Cognitive Screening:  Functional & Cognitive Status 4/26/2021   Do you have difficulty preparing food and eating? No   Do you have difficulty bathing yourself, getting dressed or grooming yourself? No   Do you have difficulty using the toilet? No   Do you have difficulty moving around from place to place? No   Do you have trouble with steps or getting out of a bed or a chair? No   Current Diet Well Balanced Diet   Dental Exam Up to date   Eye Exam Up to date   Exercise (times per week) 4 times per week   Current Exercise Activities Include Walking   Do you need help using the phone?  No   Are you deaf or do you have serious difficulty hearing?  No   Do you need help with transportation? No   Do you need help shopping? No   Do you need help preparing meals?  No   Do you need help with housework?  No   Do you need help with laundry? No   Do you need help taking your medications? No   Do you need help managing money? No   Do you ever drive or ride in a car without wearing a seat belt? No   Have you felt unusual stress, anger or loneliness in the last month? No   Who do you live with? Spouse   If you need help, do you have trouble finding someone available to you? No   Have you been bothered in the last four weeks by sexual problems? No   Do you have difficulty concentrating, remembering or making decisions? No         Does the patient have evidence of cognitive impairment? No, score 28/30    Asprin use counseling:Taking ASA appropriately as indicated    Age-appropriate Screening Schedule:  Refer to the list below for future screening recommendations based on patient's age, sex and/or medical conditions. Orders for these recommended tests are listed in the plan section. The patient has been provided with a written  plan.    Health Maintenance   Topic Date Due   • TDAP/TD VACCINES (1 - Tdap) Never done   • INFLUENZA VACCINE  08/01/2021   • LIPID PANEL  04/26/2022   • COLONOSCOPY  09/29/2023   • ZOSTER VACCINE  Completed          The following portions of the patient's history were reviewed and updated as appropriate: allergies, current medications, past family history, past medical history, past social history, past surgical history and problem list.    Outpatient Medications Prior to Visit   Medication Sig Dispense Refill   • aspirin 81 MG EC tablet Take 81 mg by mouth Daily.     • atorvastatin (LIPITOR) 20 MG tablet Take 20 mg by mouth Daily.     • lisinopril-hydrochlorothiazide (PRINZIDE,ZESTORETIC) 20-12.5 MG per tablet TAKE 1 TABLET BY MOUTH DAILY 90 tablet 3   • Multiple Vitamins-Minerals (DAILY MULTIVITAMIN) capsule DAILY MULTIVITAMIN CAPS           Patient Active Problem List   Diagnosis   • Other dorsalgia   • Hypertension   • Enlarged prostate without lower urinary tract symptoms (luts)   • Encounter for screening for malignant neoplasm of prostate   • History of hepatitis C   • Obstructive sleep apnea   • Full code status   • Pulmonary nodule   • Former smoker       Advanced Care Planning:  ACP discussion was held with the patient during this visit. Patient does not have an advance directive, information provided.  We discussed his advanced directives today.  He does not have any signed documents.  Last discussion on advanced directives was at his Medicare wellness visit on in 2020.  Patient has designated his wife (Emperatriz Monte) as his medical designee.  He does not have a formal medical power of , however.  Patient has made the decision to be a full code.  However, he would not want any medically unnecessary treatments which would prolong the dying process.  Patient was given a copy of the [place] living will form, [place] medical designee form, and [place] out of hospital DNR form for his review.  He was  "asked to return these documents to the office to upload into his medical record if he chose to sign them.      Review of Systems   Constitutional: Positive for unexpected weight change (gain). Negative for activity change, appetite change, fatigue and fever.   HENT: Negative for congestion, dental problem, ear pain, hearing loss (no new concerns today), tinnitus and trouble swallowing.    Eyes: Negative for pain, redness and visual disturbance.   Respiratory: Negative for cough and shortness of breath.    Cardiovascular: Negative for chest pain, palpitations and leg swelling.   Gastrointestinal: Negative for abdominal pain, blood in stool, constipation, diarrhea and vomiting.   Endocrine: Negative for polydipsia and polyuria.   Genitourinary: Negative for decreased urine volume, difficulty urinating, dysuria, hematuria, scrotal swelling and testicular pain.   Musculoskeletal: Negative for gait problem, joint swelling, myalgias and neck stiffness.   Skin: Negative for pallor, rash and wound.   Allergic/Immunologic: Negative for immunocompromised state.   Neurological: Negative for dizziness, tremors, seizures, syncope, speech difficulty, weakness, numbness and headaches.   Psychiatric/Behavioral: Negative for agitation, behavioral problems and sleep disturbance.     Reviewed chart for potential of high risk medication in the elderly: yes  Reviewed chart for potential of harmful drug interactions in the elderly:yes    Objective         Vitals:    04/26/21 0857   BP: 136/68   Pulse: 85   Temp: 98.9 °F (37.2 °C)   TempSrc: Temporal   SpO2: 95%   Weight: 93.2 kg (205 lb 6.4 oz)   Height: 167.6 cm (66\")       Body mass index is 33.15 kg/m².  Discussed the patient's BMI with him. The BMI is above average.     Physical Exam  Constitutional:       General: He is not in acute distress.     Appearance: Normal appearance. He is well-developed. He is obese. He is not ill-appearing.   HENT:      Head: Normocephalic and " atraumatic.      Right Ear: Tympanic membrane, ear canal and external ear normal.      Left Ear: Tympanic membrane, ear canal and external ear normal.      Nose: Nose normal.      Mouth/Throat:      Mouth: Mucous membranes are moist.      Pharynx: No oropharyngeal exudate or posterior oropharyngeal erythema.   Eyes:      General: No scleral icterus.        Right eye: No discharge.         Left eye: No discharge.      Extraocular Movements: Extraocular movements intact.      Conjunctiva/sclera: Conjunctivae normal.      Pupils: Pupils are equal, round, and reactive to light.   Neck:      Thyroid: No thyromegaly.      Vascular: No carotid bruit or JVD.      Trachea: No tracheal deviation.   Cardiovascular:      Rate and Rhythm: Normal rate and regular rhythm.      Pulses: Normal pulses.      Heart sounds: Normal heart sounds. No murmur heard.   No friction rub. No gallop.    Pulmonary:      Effort: Pulmonary effort is normal. No respiratory distress.      Breath sounds: Normal breath sounds. No wheezing or rales.   Abdominal:      General: Bowel sounds are normal.      Palpations: Abdomen is soft.      Tenderness: There is no abdominal tenderness. There is no guarding or rebound.      Hernia: No hernia is present.   Genitourinary:     Comments: Exam not performed  Musculoskeletal:         General: No swelling, deformity or signs of injury.      Cervical back: Normal range of motion and neck supple. No tenderness.      Right lower leg: No edema.      Left lower leg: No edema.   Lymphadenopathy:      Cervical: No cervical adenopathy.   Skin:     General: Skin is warm.      Capillary Refill: Capillary refill takes less than 2 seconds.      Findings: No erythema or rash.   Neurological:      General: No focal deficit present.      Mental Status: He is alert and oriented to person, place, and time. Mental status is at baseline.      Cranial Nerves: No cranial nerve deficit.      Motor: No weakness or abnormal muscle tone.       Coordination: Coordination normal.      Gait: Gait normal.   Psychiatric:         Mood and Affect: Mood normal.         Behavior: Behavior normal.         Thought Content: Thought content normal.         Judgment: Judgment normal.               Assessment/Plan   Medicare Risks and Personalized Health Plan  CMS Preventative Services Quick Reference  Advance Directive Discussion - see above  Cardiovascular risk - labs and medications per the [facility]  Colon Cancer Screening - current  Dementia/Memory - screening negative  Depression/Dysphoria - screening negative  Diabetic Lab Screening - screening per the [facility]  Fall Risk - LOW risk  Hearing Problem- chronic, stable.  Has hearing aids.  Immunizations Discussed/Encouraged (specific immunizations; Tdap ) - he was directed to the pharmacy or the [facility] for immunization.  Obesity/Overweight   Prostate Cancer Screening - per the [facility].    The above risks/problems have been discussed with the patient.  Pertinent information has been shared with the patient in the After Visit Summary.  Follow up plans and orders are seen below in the Assessment/Plan Section.    Diagnoses and all orders for this visit:    1. Medicare annual wellness visit, subsequent (Primary)    2. Full code status    3. Essential hypertension    4. Hyperlipidemia, unspecified hyperlipidemia type    5. Class 1 obesity due to excess calories with serious comorbidity and body mass index (BMI) of 33.0 to 33.9 in adult      Questions and concerns addressed.  [facility].  He is working towards eating a healthy diet.  F/U yearly or PRN.    Follow Up:  Return in about 1 year (around 4/26/2022) for Medicare Wellness.     An After Visit Summary and PPPS were given to the patient.     I wore protective equipment throughout this patient encounter to include mask. Hand hygiene was performed before donning protective equipment and after removal when leaving the room.

## 2021-08-30 ENCOUNTER — TELEPHONE (OUTPATIENT)
Dept: SLEEP MEDICINE | Facility: CLINIC | Age: 71
End: 2021-08-30

## 2021-08-30 NOTE — TELEPHONE ENCOUNTER
"Patient called in regards to the [employer] Recall. I advised him to call [employer] [phone] to get registered for a replacement. I confirmed that he is not using an ozone . I told him \"for reasons of medical necessity, we cannot advise to discontinue the use of your machine\". Patient verbally understood.  "

## 2021-12-14 ENCOUNTER — OFFICE VISIT (OUTPATIENT)
Dept: SLEEP MEDICINE | Facility: CLINIC | Age: 71
End: 2021-12-14

## 2021-12-14 VITALS
WEIGHT: 205 LBS | HEIGHT: 66 IN | BODY MASS INDEX: 32.95 KG/M2 | SYSTOLIC BLOOD PRESSURE: 125 MMHG | OXYGEN SATURATION: 98 % | HEART RATE: 65 BPM | DIASTOLIC BLOOD PRESSURE: 73 MMHG

## 2021-12-14 DIAGNOSIS — E66.9 CLASS 1 OBESITY: ICD-10-CM

## 2021-12-14 DIAGNOSIS — Z99.89 OSA ON CPAP: Primary | ICD-10-CM

## 2021-12-14 DIAGNOSIS — G47.33 OSA ON CPAP: Primary | ICD-10-CM

## 2021-12-14 PROBLEM — E66.811 CLASS 1 OBESITY: Status: ACTIVE | Noted: 2021-12-14

## 2021-12-14 PROCEDURE — 99213 OFFICE O/P EST LOW 20 MIN: CPT | Performed by: INTERNAL MEDICINE

## 2021-12-14 PROCEDURE — G0463 HOSPITAL OUTPT CLINIC VISIT: HCPCS

## 2021-12-14 NOTE — PROGRESS NOTES
"  [address] [address]  Phone   Fax       SLEEP CLINIC FOLLOW UP PROGRESS NOTE.    Jaret Monte  1950  71 y.o.  male      PCP: Mari Arechiga MD      Date of visit: 12/14/2021    Chief Complaint   Patient presents with   • Sleep Apnea   • Obesity       HPI:  This is a 71 y.o. years old patient who has a history of obstructive sleep apnea is here for  the annual compliance follow-up.  Sleep apnea is moderate in severity with a AHI of 24/hr. Patient is using positive airway pressure therapy with auto BiPAP and the symptoms of snoring, non-restorative sleep and daytime excessive sleepiness have improved significantly on the therapy. Normally goes to bed at 8 PM and wakes up at 4:30 AM.  The patient wakes up 0 time(s) during the night and has no problem going back to sleep.  Feels refreshed after waking up.  Patient also denies headaches and nasal congestion.  He is retired now used to work in concrete finishing.    He had a split-night study on April 19, 2019    Medications and allergies are reviewed by me and documented in the encounter.     SOCIAL ( habits pertaining to sleep medicine)  History tobacco use:No   History of alcohol use: 0 per week  Caffeine use: 0     REVIEW OF SYSTEMS:   [place] Sleepiness Scale :Total score: 4   Nasal congestion:No   Dry mouth/nose:Yes   Post nasal drip; No   Acid reflux/Heartburn:No   Abd bloating:No   Morning headache:No   Anxiety:No   Depression:No    PHYSICAL EXAMINATION:  CONSTITUTIONAL:  Vitals:    12/14/21 1321   BP: 125/73   BP Location: Left arm   Patient Position: Sitting   Cuff Size: Adult   Pulse: 65   SpO2: 98%   Weight: 93 kg (205 lb)   Height: 167.6 cm (66\")    Body mass index is 33.09 kg/m².   NOSE: nasal passages are clear, no nasal polyps, septum in the midline.  THROAT: throat is clear, oral airway Mallampati class 3  RESP SYSTEM: Breath sounds are normal, no wheezes or " crackles  CARDIOVASULAR: Heart rate is regular without murmur. No edema      Data reviewed:  The Smart card downloaded on 12/14/2021 has been reviewed independently by me for compliance and discussed the data with the patient.   Compliance; 100%  More than 4 hr use, 100%  Average use of the device 8 hours and 47 per night  Residual AHI: 0.5 /hr (goal < 5.0 /hr)  Mask type: Full facemask but would like to try a DreamWear full facemask  DME: [facility]      ASSESSMENT AND PLAN:  · Obstructive sleep apnea ( G 47.33).  The symptoms of sleep apnea have improved with the device and the treatment.  Patient's compliance with the device is excellent for treatment of sleep apnea.  I have independently reviewed the smart card down load and discussed with the patient the download data and encouarged the patient to continue to use the device.The residual AHI is acceptable. The device is benefiting the patient and the device is medically necessary.  Without proper control of sleep apnea and good compliance there is a increased risk for hypertension, diabetes mellitus and nonrestorative sleep with hypersomnia which can increase risk for motor vehicle accidents.  Untreated sleep apnea is also a risk factor for development of atrial fibrillation, pulmonary hypertension and stroke. The patient is also instructed to get the supplies from the [employer] and and change them on a regular basis.  A prescription for supplies has been sent to the [employer] company.  I have also discussed the good sleep hygiene habits and adequate amount of sleep needed for good health.  · Obesity, class 1 with BMI is Body mass index is 33.09 kg/m².. I have discuss the relationship between the weight and sleep apnea. The benefit of weight loss in reducing severity of sleep apnea was discussed. Discussed diet and exercise with the patient to achieve ideal BMI.   · Return in about 1 year (around 12/14/2022) for Annual visit with smartcard download. .  Patient's questions were answered.        Jian Davila MD  Sleep Medicine.  Medical Director, [facility]  12/14/2021 ,

## 2022-01-27 DIAGNOSIS — I10 ESSENTIAL HYPERTENSION: ICD-10-CM

## 2022-01-27 RX ORDER — LISINOPRIL AND HYDROCHLOROTHIAZIDE 20; 12.5 MG/1; MG/1
1 TABLET ORAL DAILY
Qty: 90 TABLET | Refills: 3 | Status: SHIPPED | OUTPATIENT
Start: 2022-01-27 | End: 2022-10-13

## 2022-05-18 DIAGNOSIS — E66.09 CLASS 1 OBESITY DUE TO EXCESS CALORIES WITH SERIOUS COMORBIDITY AND BODY MASS INDEX (BMI) OF 33.0 TO 33.9 IN ADULT: Primary | ICD-10-CM

## 2022-05-18 DIAGNOSIS — I10 ESSENTIAL HYPERTENSION: ICD-10-CM

## 2022-05-18 DIAGNOSIS — Z12.5 PROSTATE CANCER SCREENING: ICD-10-CM

## 2022-05-19 LAB
ALBUMIN SERPL-MCNC: 4.6 G/DL (ref 3.8–4.8)
ALBUMIN/GLOB SERPL: 2.2 {RATIO} (ref 1.2–2.2)
ALP SERPL-CCNC: 73 IU/L (ref 44–121)
ALT SERPL-CCNC: 25 IU/L (ref 0–44)
AST SERPL-CCNC: 24 IU/L (ref 0–40)
BILIRUB SERPL-MCNC: 0.8 MG/DL (ref 0–1.2)
BUN SERPL-MCNC: 21 MG/DL (ref 8–27)
BUN/CREAT SERPL: 16 (ref 10–24)
CALCIUM SERPL-MCNC: 9.6 MG/DL (ref 8.6–10.2)
CHLORIDE SERPL-SCNC: 99 MMOL/L (ref 96–106)
CO2 SERPL-SCNC: 25 MMOL/L (ref 20–29)
CREAT SERPL-MCNC: 1.32 MG/DL (ref 0.76–1.27)
EGFRCR SERPLBLD CKD-EPI 2021: 58 ML/MIN/1.73
GLOBULIN SER CALC-MCNC: 2.1 G/DL (ref 1.5–4.5)
GLUCOSE SERPL-MCNC: 94 MG/DL (ref 65–99)
POTASSIUM SERPL-SCNC: 4.6 MMOL/L (ref 3.5–5.2)
PROT SERPL-MCNC: 6.7 G/DL (ref 6–8.5)
SODIUM SERPL-SCNC: 140 MMOL/L (ref 134–144)

## 2022-05-25 ENCOUNTER — OFFICE VISIT (OUTPATIENT)
Dept: FAMILY MEDICINE CLINIC | Facility: CLINIC | Age: 72
End: 2022-05-25

## 2022-05-25 VITALS
RESPIRATION RATE: 18 BRPM | BODY MASS INDEX: 29.37 KG/M2 | DIASTOLIC BLOOD PRESSURE: 72 MMHG | SYSTOLIC BLOOD PRESSURE: 130 MMHG | TEMPERATURE: 97.7 F | HEIGHT: 70 IN | HEART RATE: 59 BPM | OXYGEN SATURATION: 97 % | WEIGHT: 205.13 LBS

## 2022-05-25 DIAGNOSIS — E66.3 OVERWEIGHT (BMI 25.0-29.9): ICD-10-CM

## 2022-05-25 DIAGNOSIS — I10 PRIMARY HYPERTENSION: ICD-10-CM

## 2022-05-25 DIAGNOSIS — Z87.891 FORMER SMOKER: ICD-10-CM

## 2022-05-25 DIAGNOSIS — Z00.00 MEDICARE ANNUAL WELLNESS VISIT, SUBSEQUENT: Primary | ICD-10-CM

## 2022-05-25 DIAGNOSIS — E78.5 HYPERLIPIDEMIA, UNSPECIFIED HYPERLIPIDEMIA TYPE: ICD-10-CM

## 2022-05-25 PROCEDURE — 1170F FXNL STATUS ASSESSED: CPT | Performed by: FAMILY MEDICINE

## 2022-05-25 PROCEDURE — G0439 PPPS, SUBSEQ VISIT: HCPCS | Performed by: FAMILY MEDICINE

## 2022-05-25 PROCEDURE — 1160F RVW MEDS BY RX/DR IN RCRD: CPT | Performed by: FAMILY MEDICINE

## 2022-05-25 PROCEDURE — 1159F MED LIST DOCD IN RCRD: CPT | Performed by: FAMILY MEDICINE

## 2022-05-27 LAB
ALBUMIN SERPL-MCNC: 4.4 G/DL (ref 3.8–4.8)
ALBUMIN/GLOB SERPL: 2.1 {RATIO} (ref 1.2–2.2)
ALP SERPL-CCNC: 78 IU/L (ref 44–121)
ALT SERPL-CCNC: 23 IU/L (ref 0–44)
AST SERPL-CCNC: 21 IU/L (ref 0–40)
BASOPHILS # BLD AUTO: 0.1 X10E3/UL (ref 0–0.2)
BASOPHILS NFR BLD AUTO: 1 %
BILIRUB SERPL-MCNC: 0.4 MG/DL (ref 0–1.2)
BUN SERPL-MCNC: 22 MG/DL (ref 8–27)
BUN/CREAT SERPL: 17 (ref 10–24)
CALCIUM SERPL-MCNC: 9.2 MG/DL (ref 8.6–10.2)
CHLORIDE SERPL-SCNC: 103 MMOL/L (ref 96–106)
CHOLEST SERPL-MCNC: 119 MG/DL (ref 100–199)
CHOLEST/HDLC SERPL: 2.3 RATIO (ref 0–5)
CO2 SERPL-SCNC: 24 MMOL/L (ref 20–29)
CREAT SERPL-MCNC: 1.27 MG/DL (ref 0.76–1.27)
EGFRCR SERPLBLD CKD-EPI 2021: 61 ML/MIN/1.73
EOSINOPHIL # BLD AUTO: 0.2 X10E3/UL (ref 0–0.4)
EOSINOPHIL NFR BLD AUTO: 2 %
ERYTHROCYTE [DISTWIDTH] IN BLOOD BY AUTOMATED COUNT: 13.1 % (ref 11.6–15.4)
GLOBULIN SER CALC-MCNC: 2.1 G/DL (ref 1.5–4.5)
GLUCOSE SERPL-MCNC: 92 MG/DL (ref 65–99)
HCT VFR BLD AUTO: 42.2 % (ref 37.5–51)
HDLC SERPL-MCNC: 52 MG/DL
HGB BLD-MCNC: 14.3 G/DL (ref 13–17.7)
IMM GRANULOCYTES # BLD AUTO: 0 X10E3/UL (ref 0–0.1)
IMM GRANULOCYTES NFR BLD AUTO: 0 %
LDLC SERPL CALC-MCNC: 53 MG/DL (ref 0–99)
LYMPHOCYTES # BLD AUTO: 3.1 X10E3/UL (ref 0.7–3.1)
LYMPHOCYTES NFR BLD AUTO: 34 %
MCH RBC QN AUTO: 31.2 PG (ref 26.6–33)
MCHC RBC AUTO-ENTMCNC: 33.9 G/DL (ref 31.5–35.7)
MCV RBC AUTO: 92 FL (ref 79–97)
MONOCYTES # BLD AUTO: 0.8 X10E3/UL (ref 0.1–0.9)
MONOCYTES NFR BLD AUTO: 9 %
NEUTROPHILS # BLD AUTO: 4.8 X10E3/UL (ref 1.4–7)
NEUTROPHILS NFR BLD AUTO: 54 %
PLATELET # BLD AUTO: 290 X10E3/UL (ref 150–450)
POTASSIUM SERPL-SCNC: 4.7 MMOL/L (ref 3.5–5.2)
PROT SERPL-MCNC: 6.5 G/DL (ref 6–8.5)
PSA SERPL-MCNC: 0.6 NG/ML (ref 0–4)
RBC # BLD AUTO: 4.58 X10E6/UL (ref 4.14–5.8)
SODIUM SERPL-SCNC: 141 MMOL/L (ref 134–144)
TRIGL SERPL-MCNC: 66 MG/DL (ref 0–149)
VLDLC SERPL CALC-MCNC: 14 MG/DL (ref 5–40)
WBC # BLD AUTO: 9 X10E3/UL (ref 3.4–10.8)

## 2022-06-23 ENCOUNTER — PATIENT MESSAGE (OUTPATIENT)
Dept: FAMILY MEDICINE CLINIC | Facility: CLINIC | Age: 72
End: 2022-06-23

## 2022-06-23 NOTE — TELEPHONE ENCOUNTER
From: Jaret Monte  To: Mari Arechiga MD  Sent: 6/23/2022 9:47 AM EDT  [facility]     [facility] TDAP[facility]TD Tet[facility]. I received the Pneumococcal vaccine PCV 20 [id number] (booster) on 6/21/22 [facility].

## 2022-09-09 NOTE — PROGRESS NOTES
Subjective   Jaret Monte is a 72 y.o. male.     Chief Complaint   Patient presents with   • Establish Care   • Hypertension   • Hyperlipidemia       Establish care :   Patient is here today and wants to establish care with a new PCP as he is a former Dr. Arechiga patient.     Hypertension  This is a chronic problem. The current episode started more than 1 year ago. The problem has been gradually improving since onset. The problem is controlled. Pertinent negatives include no anxiety, blurred vision, chest pain, headaches, malaise/fatigue, neck pain, orthopnea, palpitations, peripheral edema, PND, shortness of breath or sweats. (Patient is here today and he states that he has been feeling fine and he states that he has not had any issues. He states that he is taking his medication as he is suppose to be but he is not checking his pressures as much at home. ) Risk factors for coronary artery disease include family history, male gender, obesity and stress. Past treatments include diuretics and ACE inhibitors. Current antihypertension treatment includes ACE inhibitors and diuretics. The current treatment provides moderate improvement. There are no compliance problems.  There is no history of chronic renal disease.   Hyperlipidemia  This is a chronic problem. The current episode started more than 1 year ago. The problem is controlled. Recent lipid tests were reviewed and are variable. Exacerbating diseases include obesity. He has no history of chronic renal disease, diabetes, hypothyroidism, liver disease or nephrotic syndrome. Factors aggravating his hyperlipidemia include fatty foods. Pertinent negatives include no chest pain, focal sensory loss, focal weakness, leg pain, myalgias or shortness of breath. Current antihyperlipidemic treatment includes statins. The current treatment provides moderate improvement of lipids. There are no compliance problems.  Risk factors for coronary artery disease include obesity,  male sex and hypertension.        The following portions of the patient's history were reviewed and updated as appropriate: allergies, current medications, past family history, past medical history, past social history, past surgical history and problem list.    Allergies:  No Known Allergies    Social History:  Social History     Socioeconomic History   • Marital status:    Tobacco Use   • Smoking status: Former Smoker     Packs/day: 1.00     Years: 25.00     Pack years: 25.00     Quit date: 2009     Years since quittin.7   • Smokeless tobacco: Never Used   • Tobacco comment: Quit    Substance and Sexual Activity   • Alcohol use: Not Currently   • Drug use: Not Currently   • Sexual activity: Not Currently     Partners: Female       Family History:  Family History   Problem Relation Age of Onset   • Coronary artery disease Other         family members in general   • Hypertension Other         family members in general       Past Medical History :  Patient Active Problem List   Diagnosis   • Other dorsalgia   • Hypertension   • Enlarged prostate without lower urinary tract symptoms (luts)   • Encounter for screening for malignant neoplasm of prostate   • History of hepatitis C   • MAXIMO on CPAP   • Full code status   • Pulmonary nodule   • Former smoker   • Class 1 obesity   • Overweight with body mass index (BMI) of 29 to 29.9 in adult       Medication List:  Outpatient Encounter Medications as of 2022   Medication Sig Dispense Refill   • atorvastatin (LIPITOR) 20 MG tablet Take 20 mg by mouth Daily.     • lisinopril-hydrochlorothiazide (PRINZIDE,ZESTORETIC) 20-12.5 MG per tablet TAKE 1 TABLET BY MOUTH DAILY 90 tablet 3     No facility-administered encounter medications on file as of 2022.       Past Surgical History:  Past Surgical History:   Procedure Laterality Date   • OTHER SURGICAL HISTORY  2011    Turp enlarged prostate       Review of Systems:  Review of Systems  "  Constitutional: Negative for malaise/fatigue.   Eyes: Negative for blurred vision.   Respiratory: Negative for shortness of breath.    Cardiovascular: Negative for chest pain, palpitations, orthopnea and PND.   Musculoskeletal: Negative for myalgias and neck pain.   Neurological: Negative for focal weakness.       I have reviewed and confirmed the accuracy of the HPI and ROS as documented by the MA/LPN/RN Rand Villagomez MD    Vital Signs:  Visit Vitals  /60   Pulse 64   Temp 97.3 °F (36.3 °C)   Resp 18   Ht 177.8 cm (70\")   Wt 93.5 kg (206 lb 3.2 oz)   SpO2 98%   BMI 29.59 kg/m²       Physical Exam  Constitutional:       Appearance: Normal appearance.   HENT:      Head: Normocephalic and atraumatic.      Mouth/Throat:      Mouth: Mucous membranes are moist.      Pharynx: Oropharynx is clear.   Eyes:      General: No scleral icterus.     Extraocular Movements: Extraocular movements intact.      Conjunctiva/sclera: Conjunctivae normal.      Pupils: Pupils are equal, round, and reactive to light.   Cardiovascular:      Rate and Rhythm: Normal rate and regular rhythm.      Heart sounds:     No friction rub. No gallop.   Pulmonary:      Effort: Pulmonary effort is normal. No respiratory distress.      Breath sounds: Normal breath sounds. No wheezing.   Abdominal:      General: Bowel sounds are normal. There is no distension.      Palpations: Abdomen is soft.      Tenderness: There is no abdominal tenderness.   Musculoskeletal:         General: No swelling, tenderness or deformity. Normal range of motion.      Cervical back: Normal range of motion. No rigidity or tenderness.   Lymphadenopathy:      Cervical: No cervical adenopathy.   Skin:     General: Skin is warm.      Capillary Refill: Capillary refill takes less than 2 seconds.      Findings: No lesion or rash.   Neurological:      General: No focal deficit present.      Mental Status: He is alert and oriented to person, place, and time. Mental status is at " baseline.      Gait: Gait normal.   Psychiatric:         Behavior: Behavior normal.         Thought Content: Thought content normal.         Assessment and Plan:  Problem List Items Addressed This Visit        Cardiac and Vasculature    Hypertension - Primary       Other    Overweight with body mass index (BMI) of 29 to 29.9 in adult    Current Assessment & Plan     Patient's (Body mass index is 29.59 kg/m².) indicates that they are overweight with health conditions that include hypertension . Weight is improving with lifestyle modifications. BMI is is above average; BMI management plan is completed. We discussed portion control and increasing exercise.            Other Visit Diagnoses     Mixed hyperlipidemia        Encounter to establish care          presents today to establish care, vitals wnl. No acute complaints  [facility], does not need refills on HTN meds at this time  /60 today, states this is about what it usually runs with the medication  Fu in 6 months for annual or sooner if needed. Reviewed labs from May 2022 and they are wnl    An After Visit Summary and PPPS were given to the patient.       I wore protective equipment throughout this patient encounter to include mask and eye protection. Hand hygiene was performed before donning protective equipment and after removal when leaving the room.

## 2022-09-12 ENCOUNTER — OFFICE VISIT (OUTPATIENT)
Dept: FAMILY MEDICINE CLINIC | Facility: CLINIC | Age: 72
End: 2022-09-12

## 2022-09-12 VITALS
OXYGEN SATURATION: 98 % | TEMPERATURE: 97.3 F | HEART RATE: 64 BPM | SYSTOLIC BLOOD PRESSURE: 120 MMHG | BODY MASS INDEX: 29.52 KG/M2 | RESPIRATION RATE: 18 BRPM | DIASTOLIC BLOOD PRESSURE: 60 MMHG | HEIGHT: 70 IN | WEIGHT: 206.2 LBS

## 2022-09-12 DIAGNOSIS — E78.2 MIXED HYPERLIPIDEMIA: ICD-10-CM

## 2022-09-12 DIAGNOSIS — E66.3 OVERWEIGHT WITH BODY MASS INDEX (BMI) OF 29 TO 29.9 IN ADULT: ICD-10-CM

## 2022-09-12 DIAGNOSIS — I10 PRIMARY HYPERTENSION: Primary | ICD-10-CM

## 2022-09-12 DIAGNOSIS — Z76.89 ENCOUNTER TO ESTABLISH CARE: ICD-10-CM

## 2022-09-12 PROCEDURE — 99213 OFFICE O/P EST LOW 20 MIN: CPT | Performed by: STUDENT IN AN ORGANIZED HEALTH CARE EDUCATION/TRAINING PROGRAM

## 2022-09-12 NOTE — ASSESSMENT & PLAN NOTE
Patient's (Body mass index is 29.59 kg/m².) indicates that they are overweight with health conditions that include hypertension . Weight is improving with lifestyle modifications. BMI is is above average; BMI management plan is completed. We discussed portion control and increasing exercise.

## 2022-10-13 DIAGNOSIS — I10 ESSENTIAL HYPERTENSION: ICD-10-CM

## 2022-10-13 RX ORDER — LISINOPRIL AND HYDROCHLOROTHIAZIDE 20; 12.5 MG/1; MG/1
1 TABLET ORAL DAILY
Qty: 90 TABLET | Refills: 3 | Status: SHIPPED | OUTPATIENT
Start: 2022-10-13

## 2022-12-13 ENCOUNTER — OFFICE VISIT (OUTPATIENT)
Dept: SLEEP MEDICINE | Facility: CLINIC | Age: 72
End: 2022-12-13

## 2022-12-13 VITALS
HEART RATE: 61 BPM | DIASTOLIC BLOOD PRESSURE: 65 MMHG | SYSTOLIC BLOOD PRESSURE: 112 MMHG | BODY MASS INDEX: 29.35 KG/M2 | HEIGHT: 70 IN | OXYGEN SATURATION: 99 % | WEIGHT: 205 LBS

## 2022-12-13 DIAGNOSIS — E66.9 CLASS 1 OBESITY: ICD-10-CM

## 2022-12-13 DIAGNOSIS — G47.33 OSA TREATED WITH BIPAP: Primary | ICD-10-CM

## 2022-12-13 PROCEDURE — 99213 OFFICE O/P EST LOW 20 MIN: CPT | Performed by: INTERNAL MEDICINE

## 2022-12-13 PROCEDURE — G0463 HOSPITAL OUTPT CLINIC VISIT: HCPCS

## 2022-12-13 NOTE — PROGRESS NOTES
"  [address] [address]  Phone   Fax       SLEEP CLINIC FOLLOW UP PROGRESS NOTE.    Jaret Monte  [id number]   1950  72 y.o.  male      PCP: Rand Villagomez MD      Date of visit: 12/13/2022    Chief Complaint   Patient presents with   • Sleep Apnea       HPI:  This is a 72 y.o. years old patient is here for the management of obstructive sleep apnea.  Sleep apnea is moderate in severity with a AHI of 24/hr. Patient is using positive airway pressure therapy with auto BiPAP and the symptoms of snoring, non-restorative sleep and daytime excessive sleepiness have improved significantly on the therapy. Normally goes to bed at 9 PM and wakes up at 6 AM.  The patient wakes up 1 time(s) during the night and has no problem going back to sleep.  Feels refreshed after waking up.  Patient also denies headaches and nasal congestion.  He is retired but still helps out used to work as a     Medications and allergies are reviewed by me and documented in the encounter.     SOCIAL (habits pertaining to sleep medicine)  History tobacco use:No   History of alcohol use: 0 per week  Caffeine use: 0     REVIEW OF SYSTEMS:   [place] Sleepiness Scale :Total score: 2   Nasal congestion:No   Dry mouth/nose:No   Post nasal drip; No   Acid reflux/Heartburn:No   Abd bloating:No   Morning headache:No   Anxiety:No   Depression:No    PHYSICAL EXAMINATION:  CONSTITUTIONAL:  Vitals:    12/13/22 0917   BP: 112/65   BP Location: Left arm   Patient Position: Sitting   Cuff Size: Adult   Pulse: 61   SpO2: 99%   Weight: 93 kg (205 lb)   Height: 177.8 cm (70\")    Body mass index is 29.41 kg/m².   NOSE: nasal passages are clear, No deformities noted   RESP SYSTEM: Not in any respiratory distress, no chest deformities noted,   CARDIOVASULAR: No edema noted  NEURO: Oriented x 3, gait normal,  Mood and affect appeared appropriate      Data reviewed:  The Smart " card downloaded on 12/13/2022 has been reviewed independently by me for compliance and discussed the data with the patient.   Compliance; 100%  More than 4 hr use, 100%  Average use of the device 8 hours and 51 per night  Residual AHI: 0.5 /hr (goal < 5.0 /hr)  Mask type: Fullface  Device: DreamStation BiPAP which has been replaced  DME: [facility]      ASSESSMENT AND PLAN:  · Obstructive sleep apnea ( G 47.33).  The symptoms of sleep apnea have improved with the device and the treatment.  Patient's compliance with the device is excellent for treatment of sleep apnea.  I have independently reviewed the smart card down load and discussed with the patient the download data and encouarged the patient to continue to use the device.The residual AHI is acceptable. The device is benefiting the patient and the device is medically necessary.  Without proper control of sleep apnea and good compliance there is a increased risk for hypertension, diabetes mellitus and nonrestorative sleep with hypersomnia which can increase risk for motor vehicle accidents.  Untreated sleep apnea is also a risk factor for development of atrial fibrillation, pulmonary hypertension and stroke. The patient is also instructed to get the supplies from the [employer] company and and change them on a regular basis.  A prescription for supplies has been sent to the [employer] company.  I have also discussed the good sleep hygiene habits and adequate amount of sleep needed for good health.  · Overweight with BMI is Body mass index is 29.41 kg/m².. I have discuss the relationship between the weight and sleep apnea. The benefit of weight loss in reducing severity of sleep apnea was discussed. Discussed diet and exercise with the patient to achieve ideal BMI.   · Return in about 1 year (around 12/13/2023) for with smart card down load. . Patient's questions were answered.      Jian Davila MD  Sleep Medicine.  Medical Director, [facility], [place] [facility]  [facility]  12/13/2022 ,

## 2023-06-29 PROBLEM — H91.93 DECREASED HEARING OF BOTH EARS: Status: ACTIVE | Noted: 2023-06-29

## 2023-06-29 PROBLEM — Z86.010 PERSONAL HISTORY OF COLONIC POLYPS: Status: ACTIVE | Noted: 2023-06-29

## 2023-06-29 PROBLEM — Z86.0100 PERSONAL HISTORY OF COLONIC POLYPS: Status: ACTIVE | Noted: 2023-06-29

## 2023-06-29 PROBLEM — L20.9 ATOPIC DERMATITIS IN ADULT: Chronic | Status: ACTIVE | Noted: 2023-06-29

## 2023-07-05 PROBLEM — R31.9 HEMATURIA: Status: ACTIVE | Noted: 2023-07-05

## 2023-07-06 ENCOUNTER — TELEPHONE (OUTPATIENT)
Dept: FAMILY MEDICINE CLINIC | Facility: CLINIC | Age: 73
End: 2023-07-06

## 2023-07-06 PROBLEM — E66.811 CLASS 1 OBESITY: Status: RESOLVED | Noted: 2021-12-14 | Resolved: 2023-07-06

## 2023-07-06 PROBLEM — E66.9 CLASS 1 OBESITY: Status: RESOLVED | Noted: 2021-12-14 | Resolved: 2023-07-06

## 2023-07-06 PROBLEM — E66.3 OVERWEIGHT WITH BODY MASS INDEX (BMI) OF 29 TO 29.9 IN ADULT: Status: RESOLVED | Noted: 2022-09-12 | Resolved: 2023-07-06

## 2023-08-13 PROBLEM — K76.89 HEPATIC CYST: Status: ACTIVE | Noted: 2023-08-13

## 2023-08-13 PROBLEM — K57.90 DIVERTICULOSIS: Status: ACTIVE | Noted: 2023-08-13

## 2023-08-13 PROBLEM — M53.9 MULTILEVEL DEGENERATIVE DISC DISEASE: Status: ACTIVE | Noted: 2023-08-13

## 2023-08-13 PROBLEM — I25.84 CORONARY ARTERY CALCIFICATION: Status: ACTIVE | Noted: 2022-12-28

## 2023-08-13 PROBLEM — I25.10 CORONARY ARTERY CALCIFICATION: Status: ACTIVE | Noted: 2022-12-28

## 2023-08-29 ENCOUNTER — OFFICE VISIT (OUTPATIENT)
Dept: FAMILY MEDICINE CLINIC | Facility: CLINIC | Age: 73
End: 2023-08-29
Payer: MEDICARE

## 2023-08-29 VITALS
OXYGEN SATURATION: 97 % | WEIGHT: 203.4 LBS | TEMPERATURE: 98.3 F | HEIGHT: 70 IN | SYSTOLIC BLOOD PRESSURE: 121 MMHG | DIASTOLIC BLOOD PRESSURE: 72 MMHG | BODY MASS INDEX: 29.12 KG/M2 | HEART RATE: 53 BPM | RESPIRATION RATE: 12 BRPM

## 2023-08-29 DIAGNOSIS — Z87.448 HISTORY OF HEMATURIA: ICD-10-CM

## 2023-08-29 DIAGNOSIS — H91.93 DECREASED HEARING OF BOTH EARS: ICD-10-CM

## 2023-08-29 DIAGNOSIS — F43.9 STRESS AT HOME: ICD-10-CM

## 2023-08-29 DIAGNOSIS — F41.9 ANXIETY: ICD-10-CM

## 2023-08-29 DIAGNOSIS — F51.01 PRIMARY INSOMNIA: ICD-10-CM

## 2023-08-29 DIAGNOSIS — N02.9 IDIOPATHIC HEMATURIA, UNSPECIFIED WHETHER GLOMERULAR MORPHOLOGIC CHANGES PRESENT: ICD-10-CM

## 2023-08-29 DIAGNOSIS — E04.9 ENLARGED THYROID: ICD-10-CM

## 2023-08-29 DIAGNOSIS — Z00.00 MEDICARE ANNUAL WELLNESS VISIT, SUBSEQUENT: Primary | ICD-10-CM

## 2023-08-29 DIAGNOSIS — R91.1 PULMONARY NODULE: ICD-10-CM

## 2023-08-29 LAB
BILIRUB BLD-MCNC: NEGATIVE MG/DL
CLARITY, POC: CLEAR
COLOR UR: YELLOW
EXPIRATION DATE: 0
GLUCOSE UR STRIP-MCNC: NEGATIVE MG/DL
KETONES UR QL: NEGATIVE
LEUKOCYTE EST, POC: NEGATIVE
Lab: 0
NITRITE UR-MCNC: NEGATIVE MG/ML
PH UR: 5.5 [PH] (ref 5–8)
PROT UR STRIP-MCNC: NEGATIVE MG/DL
RBC # UR STRIP: ABNORMAL /UL
SP GR UR: 1.02 (ref 1–1.03)
UROBILINOGEN UR QL: ABNORMAL

## 2023-08-29 RX ORDER — TRAZODONE HYDROCHLORIDE 50 MG/1
50 TABLET ORAL NIGHTLY PRN
Qty: 30 TABLET | Refills: 0 | Status: SHIPPED | OUTPATIENT
Start: 2023-08-29

## 2023-08-29 RX ORDER — SERTRALINE HYDROCHLORIDE 25 MG/1
25 TABLET, FILM COATED ORAL DAILY
Qty: 30 TABLET | Refills: 12 | Status: SHIPPED | OUTPATIENT
Start: 2023-08-29

## 2023-08-29 NOTE — PATIENT INSTRUCTIONS
Continue current plan of care as discussed.   Take medication as ordered (if applicable).    Practice good sleep hygiene.  Eat a well balanced diet with fresh fruit and vegetables.    Stay hydrated, drink water daily.      Limit sodium: salt, seasoned salt, garlic salt, onion salt, celery salt, etc.   Limit sweetened beverages, sodas, juices.    Bake, boil, broil or grill your food, avoid eating fried foods.   Regular exercise is important.  Incorporate weight or resistance into your routine.

## 2023-08-29 NOTE — PROGRESS NOTES
The ABCs of the Annual Wellness Visit  Subsequent Medicare Wellness Visit    Subjective    Jaret Monte is a 73 y.o. male who presents for a Subsequent Medicare Wellness Visit.    The following portions of the patient's history were reviewed and   updated as appropriate: allergies, current medications, past family history, past medical history, past social history, past surgical history, and problem list.    Compared to one year ago, the patient feels his physical   health is the same.    Compared to one year ago, the patient feels his mental   health is the same.    Recent Hospitalizations:  He was not admitted to the hospital during the last year.       Current Medical Providers:  Patient Care Team:  Tamara Escalante APRN as PCP - General (Nurse Practitioner)    Outpatient Medications Prior to Visit   Medication Sig Dispense Refill    atorvastatin (LIPITOR) 20 MG tablet Take 1 tablet by mouth Daily.      lisinopril-hydrochlorothiazide (PRINZIDE,ZESTORETIC) 20-12.5 MG per tablet Take 1 tablet by mouth Daily. 90 tablet 3    cetirizine-pseudoephedrine (ZyrTEC-D) 5-120 MG per 12 hr tablet Take 1 tablet by mouth 2 (Two) Times a Day. 5 tablet 0    hydrOXYzine (ATARAX) 25 MG tablet Take 1 tablet by mouth 3 (Three) Times a Day As Needed for Itching. 30 tablet 1    predniSONE (DELTASONE) 20 MG tablet Take 1 tablet by mouth Daily. Take one tablet twice a day for three days then one tablet every day for four days. 10 tablet 0    triamcinolone (KENALOG) 0.1 % cream Apply 1 application topically to the appropriate area as directed 2 (Two) Times a Day. 60 g 0     No facility-administered medications prior to visit.       No opioid medication identified on active medication list. I have reviewed chart for other potential  high risk medication/s and harmful drug interactions in the elderly.        Aspirin is not on active medication list.  Aspirin use is not indicated based on review of current medical condition/s. Risk of  "harm outweighs potential benefits.  .    Patient Active Problem List   Diagnosis    Other dorsalgia    Hypertension    Enlarged prostate without lower urinary tract symptoms (luts)    History of hepatitis C    MAXIMO treated with BiPAP    Full code status    Pulmonary nodule    Former smoker    Personal history of colonic polyps    Atopic dermatitis in adult    Decreased hearing of both ears    Hematuria    BMI 30.0-30.9,adult    Coronary artery calcification    Hepatic cyst    Multilevel degenerative disc disease    Diverticulosis    Anxiety    Primary insomnia    Stress at home    Enlarged thyroid    History of hematuria     Advance Care Planning   Advance Care Planning     Advance Directive is not on file.  ACP discussion was held with the patient during this visit. Patient does not have an advance directive, declines further assistance.     Objective    Vitals:    23 0818   BP: 121/72   BP Location: Left arm   Patient Position: Sitting   Cuff Size: Adult   Pulse: 53   Resp: 12   Temp: 98.3 øF (36.8 øC)   TempSrc: Infrared   SpO2: 97%   Weight: 92.3 kg (203 lb 6.4 oz)   Height: 177.8 cm (70\")     Estimated body mass index is 29.18 kg/mý as calculated from the following:    Height as of this encounter: 177.8 cm (70\").    Weight as of this encounter: 92.3 kg (203 lb 6.4 oz).           Does the patient have evidence of cognitive impairment? No    Lab Results   Component Value Date    CHLPL 112 2023    TRIG 95 2023    HDL 44 2023    LDL 50 2023    VLDL 18 2023    HGBA1C 5.8 (H) 2023        HEALTH RISK ASSESSMENT    Smoking Status:  Social History     Tobacco Use   Smoking Status Former    Packs/day: 1.00    Years: 25.00    Pack years: 25.00    Types: Cigarettes    Quit date: 2009    Years since quittin.6   Smokeless Tobacco Never   Tobacco Comments    Quit 2007     Alcohol Consumption:  Social History     Substance and Sexual Activity   Alcohol Use Not Currently "     Fall Risk Screen:    [facility] and patient is at LOW risk for falls.Assessment completed on:2023    Depression Screenin/29/2023     8:22 AM   PHQ-2/PHQ-9 Depression Screening   Little Interest or Pleasure in Doing Things 0-->not at all   Feeling Down, Depressed or Hopeless 0-->not at all   PHQ-9: Brief Depression Severity Measure Score 0       Health Habits and Functional and Cognitive Screenin/29/2023     8:21 AM   Functional & Cognitive Status   Do you have difficulty preparing food and eating? No   Do you have difficulty bathing yourself, getting dressed or grooming yourself? No   Do you have difficulty using the toilet? No   Do you have difficulty moving around from place to place? No   Do you have trouble with steps or getting out of a bed or a chair? No   Current Diet Well Balanced Diet   Dental Exam Up to date   Eye Exam Up to date   Exercise (times per week) 3 times per week   Current Exercises Include Walking   Do you need help using the phone?  No   Are you deaf or do you have serious difficulty hearing?  No   Do you need help to go to places out of walking distance? No   Do you need help shopping? No   Do you need help preparing meals?  No   Do you need help with housework?  No   Do you need help with laundry? No   Do you need help taking your medications? No   Do you need help managing money? No   Do you ever drive or ride in a car without wearing a seat belt? No   Have you felt unusual stress, anger or loneliness in the last month? Yes   Who do you live with? Spouse   If you need help, do you have trouble finding someone available to you? No   Do you have difficulty concentrating, remembering or making decisions? No       Age-appropriate Screening Schedule:  Refer to the list below for future screening recommendations based on patient's age, sex and/or medical conditions. Orders for these recommended tests are listed in the plan section. The  patient has been provided with a written plan.    Health Maintenance   Topic Date Due    COVID-19 Vaccine (2 - Booster for Tereza series) 05/20/2021    COLORECTAL CANCER SCREENING  09/29/2023    INFLUENZA VACCINE  10/01/2023    LUNG CANCER SCREENING  12/28/2023    LIPID PANEL  06/29/2024    BMI FOLLOWUP  06/29/2024    ANNUAL WELLNESS VISIT  08/29/2024    TDAP/TD VACCINES (2 - Td or Tdap) 06/21/2032    Pneumococcal Vaccine 65+  Completed    AAA SCREEN (ONE-TIME)  Completed    ZOSTER VACCINE  Completed    Hepatitis B  Aged Out    HEPATITIS C SCREENING  Discontinued                  CMS Preventative Services Quick Reference  Risk Factors Identified During Encounter  Depression/Dysphoria: Prescribed new antidepressant medication treatment. Follow up visit planned.  Hearing Problem:  [facility]   The above risks/problems have been discussed with the patient.  Pertinent information has been shared with the patient in the After Visit Summary.  An After Visit Summary and PPPS were made available to the patient.    Follow Up:   Next Medicare Wellness visit to be scheduled in 1 year.       Additional E&M Note during same encounter follows:  Patient has multiple medical problems which are significant and separately identifiable that require additional work above and beyond the Medicare Wellness Visit.      Chief Complaint  Medicare Wellness-subsequent    Subjective        HPI  Jaret Monte is also being seen today for medication refills.     Review of Systems   Constitutional:  Negative for activity change, appetite change, chills, fatigue and fever.   HENT: Negative.     Respiratory:  Negative for cough, shortness of breath and wheezing.    Cardiovascular:  Negative for chest pain, palpitations and leg swelling.   Neurological:  Negative for dizziness and weakness.   Hematological:  Does not bruise/bleed easily.   Psychiatric/Behavioral:  Positive for dysphoric mood and  "self-injury. Negative for sleep disturbance and suicidal ideas.      Objective   Vital Signs:  /72 (BP Location: Left arm, Patient Position: Sitting, Cuff Size: Adult)   Pulse 53   Temp 98.3 øF (36.8 øC) (Infrared)   Resp 12   Ht 177.8 cm (70\")   Wt 92.3 kg (203 lb 6.4 oz)   SpO2 97%   BMI 29.18 kg/mý     Physical Exam  Vitals and nursing note reviewed.   Constitutional:       General: He is not in acute distress.     Appearance: Normal appearance. He is well-groomed. He is ill-appearing. He is not toxic-appearing or diaphoretic.   HENT:      Head: Normocephalic and atraumatic.      Mouth/Throat:      Mouth: Mucous membranes are moist.      Pharynx: Oropharynx is clear.   Eyes:      General: No scleral icterus.     Extraocular Movements: Extraocular movements intact.      Conjunctiva/sclera: Conjunctivae normal.      Pupils: Pupils are equal, round, and reactive to light.   Neck:      Thyroid: Thyromegaly present. No thyroid tenderness.   Cardiovascular:      Rate and Rhythm: Normal rate and regular rhythm.      Heart sounds:     No friction rub. No gallop.   Pulmonary:      Effort: Pulmonary effort is normal.      Breath sounds: Normal breath sounds.   Musculoskeletal:         General: No swelling, tenderness or deformity. Normal range of motion.      Cervical back: Normal range of motion. No rigidity or tenderness.   Lymphadenopathy:      Cervical: No cervical adenopathy.   Skin:     General: Skin is warm.      Findings: No lesion or rash.   Neurological:      General: No focal deficit present.      Mental Status: He is alert and oriented to person, place, and time. Mental status is at baseline.      Gait: Gait normal.   Psychiatric:         Attention and Perception: Attention normal.         Mood and Affect: Mood is depressed. Affect is tearful.         Speech: Speech normal.         Behavior: Behavior normal. Behavior is cooperative.         Judgment: Judgment normal.      Comments: Anxiety related " to feelings of being unwanted by spouse. (Recent loss of mother in law). Discussed spouse's emotions related to losing mother. 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 He acknowledges the feelings are fed off of his wife's desire to be left alone and is not being emotionally connected to him at this time.  Discussed normal course of mourning a parent and advised him to seek psychiatric counseling. He prefers to not and reports he was apprehensive to discuss with me.          The following data was reviewed by: SHANTE Sol on 08/29/2023:               Assessment and Plan   Diagnoses and all orders for this visit:    1. Medicare annual wellness visit, subsequent (Primary)    2. History of hematuria  Comments:  Sees Dr. Crawford regularly for this concern.  Orders:  -     POC Urinalysis Dipstick, Automated  -     Cancel: Urinalysis With Microscopic - Urine, Clean Catch  -     Urinalysis With Microscopic - Urine, Clean Catch    3. Decreased hearing of both ears  Comments:  [facility]    Reports decreased hearing.   Advised [facility].    4. Pulmonary nodule  Comments:  [facility]    5. Stress at home  Comments:  related to loss of mother in law.  Orders:  -     sertraline (ZOLOFT) 25 MG tablet; Take 1 tablet by mouth Daily.  Dispense: 30 tablet; Refill: 12    6. Enlarged thyroid  -     US Thyroid  -     TSH Rfx On Abnormal To Free T4  -     T4, free    7. Primary insomnia  -     traZODone (DESYREL) 50 MG tablet; Take 1 tablet by mouth At Night As Needed for Sleep.  Dispense: 30 tablet; Refill: 0    8. Anxiety  Assessment & Plan:  Anxiety related to feelings of being unwanted by spouse. (Recent loss of mother in law). Discussed spouse's emotions related to losing mother. 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  Patient was given instructions and counseling regarding his condition or for health maintenance advice. Please see specific information pulled into the AVS if appropriate.

## 2023-08-30 DIAGNOSIS — N02.9 PERSISTENT HEMATURIA: Primary | ICD-10-CM

## 2023-08-30 DIAGNOSIS — Z87.891 HISTORY OF TOBACCO ABUSE: ICD-10-CM

## 2023-08-30 LAB
APPEARANCE UR: CLEAR
BACTERIA #/AREA URNS HPF: ABNORMAL /[HPF]
BILIRUB UR QL STRIP: NEGATIVE
CASTS URNS QL MICRO: ABNORMAL /LPF
COLOR UR: YELLOW
EPI CELLS #/AREA URNS HPF: ABNORMAL /HPF (ref 0–10)
GLUCOSE UR QL STRIP: NEGATIVE
HGB UR QL STRIP: ABNORMAL
KETONES UR QL STRIP: NEGATIVE
LEUKOCYTE ESTERASE UR QL STRIP: NEGATIVE
MICRO URNS: ABNORMAL
NITRITE UR QL STRIP: NEGATIVE
PH UR STRIP: 5.5 [PH] (ref 5–7.5)
PROT UR QL STRIP: NEGATIVE
RBC #/AREA URNS HPF: ABNORMAL /HPF (ref 0–2)
SP GR UR STRIP: 1.02 (ref 1–1.03)
UROBILINOGEN UR STRIP-MCNC: 0.2 MG/DL (ref 0.2–1)
WBC #/AREA URNS HPF: ABNORMAL /HPF (ref 0–5)

## 2023-08-30 NOTE — ASSESSMENT & PLAN NOTE
Anxiety related to feelings of being unwanted by spouse. (Recent loss of mother in law). Discussed spouse's emotions related to losing mother. 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

## 2023-09-11 ENCOUNTER — HOSPITAL ENCOUNTER (OUTPATIENT)
Dept: ULTRASOUND IMAGING | Facility: HOSPITAL | Age: 73
Discharge: HOME | End: 2023-09-11
Payer: MEDICARE

## 2023-09-11 DIAGNOSIS — Z87.891 HISTORY OF TOBACCO ABUSE: ICD-10-CM

## 2023-09-11 DIAGNOSIS — N02.9 PERSISTENT HEMATURIA: ICD-10-CM

## 2023-09-11 PROCEDURE — 76775 US EXAM ABDO BACK WALL LIM: CPT

## 2023-09-11 PROCEDURE — 76536 US EXAM OF HEAD AND NECK: CPT

## 2023-09-12 ENCOUNTER — TELEPHONE (OUTPATIENT)
Dept: FAMILY MEDICINE CLINIC | Facility: CLINIC | Age: 73
End: 2023-09-12

## 2023-09-12 DIAGNOSIS — R93.429 ABNORMAL RENAL ULTRASOUND: ICD-10-CM

## 2023-09-12 DIAGNOSIS — N28.89 RENAL MASS: Primary | ICD-10-CM

## 2023-09-14 PROBLEM — E04.2 MULTIPLE THYROID NODULES: Status: ACTIVE | Noted: 2023-09-11

## 2023-09-15 ENCOUNTER — HOSPITAL ENCOUNTER (OUTPATIENT)
Dept: MRI IMAGING | Facility: HOSPITAL | Age: 73
Discharge: HOME | End: 2023-09-15
Payer: MEDICARE

## 2023-09-15 LAB
CREAT BLDA-MCNC: 1.4 MG/DL (ref 0.6–1.3)
EGFRCR SERPLBLD CKD-EPI 2021: 53.7 ML/MIN/1.73

## 2023-09-15 PROCEDURE — A9579 GAD-BASE MR CONTRAST NOS,1ML: HCPCS

## 2023-09-15 PROCEDURE — 74183 MRI ABD W/O CNTR FLWD CNTR: CPT

## 2023-09-15 PROCEDURE — 82565 ASSAY OF CREATININE: CPT

## 2023-09-15 PROCEDURE — 25010000002 GADOTERIDOL PER 1 ML

## 2023-09-15 RX ADMIN — GADOTERIDOL 20 ML: 279.3 INJECTION, SOLUTION INTRAVENOUS at 11:24

## 2023-09-15 NOTE — ASSESSMENT & PLAN NOTE
TSH and T4 levels previously ordered.  Patient has not completed.  Advised patient to complete.  Follow-up to be determined.

## 2023-09-17 LAB
T4 FREE SERPL-MCNC: 1.29 NG/DL (ref 0.82–1.77)
TSH SERPL DL<=0.005 MIU/L-ACNC: 2.01 UIU/ML (ref 0.45–4.5)

## 2023-09-27 NOTE — TELEPHONE ENCOUNTER
Caller: Jaret Monte    Relationship: Self    Best call back number:     Jaret Monte (Self) [phone] (Mobile)       What was the call regarding: PATIENT WANTED TO KNOW THE DIRECTIONS PRIOR TO HIS MRI APPT     Is it okay if the provider responds through MyChart: PLEASE CALL WITH INSTRUCTIONS        with patient no discharge R/no diplopia/no photophobia/no lacrimation L/no blurred vision R/no lacrimation R/no blurred vision L/no discharge L

## 2023-09-27 NOTE — PROGRESS NOTES
Office Note     Name: Jaret Monte    : 1950     MRN: [id number]     Chief Complaint  Anxiety    Subjective     History of Present Illness:  Jaret Monte is a 73 y.o. male who presents today for Follow up on   History of Present Illness  Patient is here to follow up on anxiety and depression. He was started on sertraline last visit and reports he is doing much better. The only side effect he reports is a low libido. He does not want to continue the medication.   Anxiety  Presents for follow-up visit. Patient reports no chest pain, confusion, depressed mood, dizziness, nausea, nervous/anxious behavior, palpitations, shortness of breath or suicidal ideas. Symptoms occur rarely. The patient sleeps 9 hours per night. The quality of sleep is good. Nighttime awakenings: none.      Answers submitted by the patient for this visit:  Office Visit on 2023  1:00 PM with Tamara Escalante  Primary Reason for Visit (Submitted on 2023)  What is the primary reason for your visit?: Other  Other (Submitted on 2023)  Please describe your symptoms.: follow up from starting sertraline  Have you had these symptoms before?: No  How long have you been having these symptoms?: Greater than 2 weeks  Please list any medications you are currently taking for this condition.: sertraline    No Known Allergies      Current Outpatient Medications:     atorvastatin (LIPITOR) 20 MG tablet, Take 1 tablet by mouth Daily., Disp: , Rfl:     lisinopril-hydrochlorothiazide (PRINZIDE,ZESTORETIC) 20-12.5 MG per tablet, Take 1 tablet by mouth Daily., Disp: 90 tablet, Rfl: 3    [START ON 10/7/2023] Vilazodone HCl (Viibryd Starter Pack) 10 & 20 MG kit, Take 10 mg by mouth Daily for 7 days, THEN 20 mg Daily for 21 days., Disp: 7 each, Rfl: 0    Past Medical History:   Diagnosis Date    Acute non-recurrent frontal sinusitis     Advanced care planning/counseling discussion     Allergic rhinitis due to pollen     BPH (benign  prostatic hyperplasia)     Cataract     had cateract surgery about a year ago    Colon polyp     every 3 years i go in and get them removed    Essential hypertension     Fever and chills     Hearing disorder     Hepatitis C     History of tobacco use     HL (hearing loss)     hearing aids    Hyperlipidemia     take a pill once a day    Hypertension     Snoring     Visual impairment     wear glasses       Review of Systems   Constitutional:  Negative for activity change, appetite change, chills, fatigue and fever.   HENT:          Decreased hearing    Eyes:  Negative for blurred vision.   Respiratory:  Negative for cough, shortness of breath and wheezing.    Cardiovascular:  Negative for chest pain, palpitations and leg swelling.   Gastrointestinal:  Negative for constipation, diarrhea, nausea, vomiting, GERD and indigestion.   Genitourinary:  Positive for decreased libido.   Musculoskeletal: Negative.  Negative for myalgias and neck pain.   Allergic/Immunologic: Negative for environmental allergies and food allergies.   Neurological:  Negative for dizziness, weakness, headache and confusion.        Occasional vertigo for years.   Psychiatric/Behavioral:  Negative for self-injury, sleep disturbance, suicidal ideas, depressed mood and stress. The patient is not nervous/anxious.         Depression anxiety controlled but has sexual side effects.      Social History     Socioeconomic History    Marital status:    Tobacco Use    Smoking status: Former     Packs/day: 1.00     Years: 25.00     Pack years: 25.00     Types: Cigarettes     Quit date: 2009     Years since quittin.7    Smokeless tobacco: Never    Tobacco comments:     Quit 2007   Vaping Use    Vaping Use: Never used   Substance and Sexual Activity    Alcohol use: Not Currently    Drug use: Not Currently    Sexual activity: Yes     Partners: Female       Family History   Problem Relation Age of Onset    Coronary artery disease Other         family  "members in general    Hypertension Other         family members in general           8/29/2023     8:22 AM   PHQ-2/PHQ-9 Depression Screening   Little Interest or Pleasure in Doing Things 0-->not at all   Feeling Down, Depressed or Hopeless 0-->not at all   PHQ-9: Brief Depression Severity Measure Score 0       Fall Risk Screen:  [facility] Fall Risk Assessment was completed, and patient is at LOW risk for falls.Assessment completed on:8/29/2023      Objective     /62 (BP Location: Right arm, Patient Position: Sitting, Cuff Size: Adult)   Pulse 59   Resp 18   Ht 177.8 cm (70\")   Wt 91.6 kg (202 lb)   SpO2 98%   BMI 28.98 kg/m²     BP Readings from Last 2 Encounters:   09/29/23 108/62   08/29/23 121/72       Wt Readings from Last 2 Encounters:   09/29/23 91.6 kg (202 lb)   08/29/23 92.3 kg (203 lb 6.4 oz)       BMI is >= 25 and <30. (Overweight) The following options were offered after discussion;: weight loss educational material (shared in after visit summary), exercise counseling/recommendations, and nutrition counseling/recommendations         Physical Exam  Vitals and nursing note reviewed.   Constitutional:       General: He is not in acute distress.     Appearance: Normal appearance. He is well-groomed. He is not ill-appearing, toxic-appearing or diaphoretic.   HENT:      Head: Normocephalic and atraumatic.   Eyes:      General: Vision grossly intact.   Neck:      Vascular: No carotid bruit.   Cardiovascular:      Rate and Rhythm: Normal rate and regular rhythm.      Heart sounds: Normal heart sounds. No murmur heard.  Pulmonary:      Effort: Pulmonary effort is normal.      Breath sounds: Normal breath sounds and air entry.   Musculoskeletal:      Right lower leg: No edema.      Left lower leg: No edema.   Skin:     General: Skin is warm and dry.   Neurological:      Mental Status: He is alert and oriented to person, place, and time. Mental status is at baseline.   Psychiatric:         Attention and " Perception: Attention normal.         Mood and Affect: Mood and affect normal.         Behavior: Behavior normal. Behavior is cooperative.         Thought Content: Thought content normal.     Derm Physical Exam  Result Review :     The following data was reviewed by: SHANTE Sol on 09/29/2023:  CMP          6/29/2023    09:57 9/15/2023    10:53   CMP   Glucose 90     BUN 20     Creatinine 1.31  1.40    EGFR  53.7    Sodium 141     Potassium 4.3     Chloride 102     Calcium 9.3     Total Protein 6.7     Albumin 4.5     Globulin 2.2     Total Bilirubin 0.7     Alkaline Phosphatase 77     AST (SGOT) 23     ALT (SGPT) 26     BUN/Creatinine Ratio 15       CBC          6/29/2023    09:57   CBC   WBC 9.1    RBC 4.75    Hemoglobin 14.9    Hematocrit 42.0    MCV 88    MCH 31.4    MCHC 35.5    RDW 13.1    Platelets 283      CBC w/diff          6/29/2023    09:57   CBC w/Diff   WBC 9.1    RBC 4.75    Hemoglobin 14.9    Hematocrit 42.0    MCV 88    MCH 31.4    MCHC 35.5    RDW 13.1    Platelets 283    Neutrophil Rel % 60    Lymphocyte Rel % 29    Monocyte Rel % 9    Eosinophil Rel % 2    Basophil Rel % 0      Lipid Panel          6/29/2023    09:57   Lipid Panel   Total Cholesterol 112    Triglycerides 95    HDL Cholesterol 44    VLDL Cholesterol 18    LDL Cholesterol  50      UA          6/29/2023    00:00 6/29/2023    10:26 8/29/2023    16:52 8/29/2023    16:54   Urinalysis   Ketones, UA  Negative  Negative     Blood, UA Trace    1+    Leukocytes, UA Negative  Negative  Negative  Negative    Nitrite, UA Negative    Negative    RBC, UA None seen    3-10    Bacteria, UA None seen    None seen      MRI Abdomen With & Without Contrast    Result Date: 9/15/2023  Impression: 1. No suspicious renal masses. 2. 1.2 cm hemorrhagic or proteinaceous cyst of the right upper renal pole. A second question of right renal lesion on ultrasound has no abnormal MRI correlate. 3. Small bilateral renal cortical cysts. Multiple small  hepatic cysts. Electronically Signed: Miranda Jay MD  9/15/2023 11:42 AM EDT  Workstation ID: [id number]    US Thyroid    Result Date: 9/11/2023  Impression: Impression: 1. TI-RADS 3 nodule in the right thyroid lobe measuring 1.1 cm. No follow-up needed based on size. 2. TI-RADS 3 nodule in the left thyroid lobe measuring 0.8 cm. No follow-up needed based on size. TI-RADS: TR3 - Size less than 1.5 cm. No follow up needed. Electronically Signed: Willard Machado MD  9/11/2023 10:51 AM EDT  Workstation ID: [id number]    US Renal Bilateral    Result Date: 9/11/2023  Impression: Impression: 1. Hypoechoic right renal lesions measuring up to 1.4 cm. These do not appear clearly cystic by ultrasound. Further evaluation by renal mass protocol MRI or CT recommended given the history of hematuria. Electronically Signed: Antonio Amaya MD  9/11/2023 8:51 AM EDT  Workstation ID: [id number]   Data reviewed : Urology, radiology reviewed       Assessment and Plan     Procedures  Plan    Diagnoses and all orders for this visit:    1. Anxiety and depression (Primary)  Comments:  Depression anxiety controlled but has sexual side effects.   Switching to atypical Viibryd  Orders:  -     Vilazodone HCl (Viibryd Starter Pack) 10 & 20 MG kit; Take 10 mg by mouth Daily for 7 days, THEN 20 mg Daily for 21 days.  Dispense: 7 each; Refill: 0    2. Need for influenza vaccination  -     Fluzone High-Dose 65+yrs        Problem List Items Addressed This Visit          Active Problems    Anxiety and depression - Primary    Relevant Medications    Vilazodone HCl (Viibryd Starter Pack) 10 & 20 MG kit (Start on 10/7/2023)     Other Visit Diagnoses       Need for influenza vaccination        Relevant Orders    Fluzone High-Dose 65+yrs (Completed)             Follow Up   Wrapup Tab  Return in about 4 weeks (around 10/27/2023) for Recheck My chart Video visit .   Patient Instructions   Continue current plan of care as discussed.   Take medication as ordered (if  applicable).    Practice good sleep hygiene.  Eat a well balanced diet with fresh fruit and vegetables.    Drink at least 8 bottles of water or equivalent per day.     Limit sweetened beverages, sodas, juices.    Bake, boil, broil or grill your food, avoid eating fried foods.   Exercise at least 150 minutes per week.      Patient was given instructions and counseling regarding his condition or for health maintenance advice. Please see specific information pulled into the AVS if appropriate.  Hand hygiene was performed during entrance to exam room and following assessment of patient. This document is intended for medical expert use only.     EMR Dragon/Transcription disclaimer:   Much of this encounter note is an electronic transcription/translation of spoken language to printed text. The electronic translation of spoken language may permit erroneous, or at times, nonsensical words or phrases to be inadvertently transcribed.      SHANTE Sol, FNP-C  [facility] [address]  [facility]  [address] [place] [address] [address]  [facility] [address] [phone]

## 2023-09-29 ENCOUNTER — TELEPHONE (OUTPATIENT)
Dept: FAMILY MEDICINE CLINIC | Facility: CLINIC | Age: 73
End: 2023-09-29

## 2023-09-29 ENCOUNTER — OFFICE VISIT (OUTPATIENT)
Dept: FAMILY MEDICINE CLINIC | Facility: CLINIC | Age: 73
End: 2023-09-29
Payer: MEDICARE

## 2023-09-29 VITALS
DIASTOLIC BLOOD PRESSURE: 62 MMHG | RESPIRATION RATE: 18 BRPM | WEIGHT: 202 LBS | OXYGEN SATURATION: 98 % | HEART RATE: 59 BPM | SYSTOLIC BLOOD PRESSURE: 108 MMHG | HEIGHT: 70 IN | BODY MASS INDEX: 28.92 KG/M2

## 2023-09-29 DIAGNOSIS — F41.9 ANXIETY AND DEPRESSION: Primary | ICD-10-CM

## 2023-09-29 DIAGNOSIS — Z23 NEED FOR INFLUENZA VACCINATION: ICD-10-CM

## 2023-09-29 DIAGNOSIS — F32.A ANXIETY AND DEPRESSION: Primary | ICD-10-CM

## 2023-09-29 PROCEDURE — 99213 OFFICE O/P EST LOW 20 MIN: CPT

## 2023-09-29 PROCEDURE — 90662 IIV NO PRSV INCREASED AG IM: CPT

## 2023-09-29 PROCEDURE — G0008 ADMIN INFLUENZA VIRUS VAC: HCPCS

## 2023-09-29 PROCEDURE — 3078F DIAST BP <80 MM HG: CPT

## 2023-09-29 PROCEDURE — 3074F SYST BP LT 130 MM HG: CPT

## 2023-09-29 RX ORDER — VILAZODONE HYDROCHLORIDE 10 MG-20MG
KIT ORAL
Qty: 7 EACH | Refills: 0 | Status: SHIPPED | OUTPATIENT
Start: 2023-10-07 | End: 2023-10-01

## 2023-09-29 NOTE — TELEPHONE ENCOUNTER
Caller: Jaret Monte    Relationship: Self    Best call back number: [phone]    What medications are you currently taking:   Current Outpatient Medications on File Prior to Visit   Medication Sig Dispense Refill    atorvastatin (LIPITOR) 20 MG tablet Take 1 tablet by mouth Daily.      lisinopril-hydrochlorothiazide (PRINZIDE,ZESTORETIC) 20-12.5 MG per tablet Take 1 tablet by mouth Daily. 90 tablet 3    [START ON 10/7/2023] Vilazodone HCl (Viibryd Starter Pack) 10 & 20 MG kit Take 10 mg by mouth Daily for 7 days, THEN 20 mg Daily for 21 days. 7 each 0    [DISCONTINUED] sertraline (ZOLOFT) 25 MG tablet Take 1 tablet by mouth Daily. 30 tablet 12    [DISCONTINUED] traZODone (DESYREL) 50 MG tablet Take 1 tablet by mouth At Night As Needed for Sleep. 30 tablet 0     No current facility-administered medications on file prior to visit.          When did you start taking these medications: N/A    Which medication are you concerned about:     Vilazodone HCl (Viibryd Starter Pack) 10 & 20 MG kit (Future)  0.25 kits (10 mg), Daily Starting Sat 10/7/2023, For 7 days, THEN 0.5 kits (20 mg), Daily Starting Sat 10/14/2023, For 21 days       Who prescribed you this medication: IBIS SEGUNDO     What are your concerns: PATIENT SAID HE CANNOT AFFORD THE VILAZODONE MEDICATION BECAUSE IT IS 90 DOLLARS A MONTH, AND SEE IF HE CAN TAKE SOMETHING MORE COST EFFECTIVE     HE ALSO NEEDS TO KNOW IF HE NEEDS TO BE WEANED OFF THE SERTRALINE OR STAY ON IT INSTEAD OF TAKING THE VILAZODONE     How long have you had these concerns: N/A

## 2023-10-01 RX ORDER — FLUOXETINE HYDROCHLORIDE 20 MG/1
20 CAPSULE ORAL DAILY
Qty: 30 CAPSULE | Refills: 12 | Status: SHIPPED | OUTPATIENT
Start: 2023-10-01 | End: 2023-10-02

## 2023-10-02 DIAGNOSIS — F43.9 STRESS AT HOME: ICD-10-CM

## 2023-10-02 RX ORDER — SERTRALINE HYDROCHLORIDE 25 MG/1
25 TABLET, FILM COATED ORAL DAILY
Qty: 90 TABLET | Refills: 3 | Status: SHIPPED | OUTPATIENT
Start: 2023-10-02

## 2023-10-26 ENCOUNTER — PATIENT MESSAGE (OUTPATIENT)
Dept: FAMILY MEDICINE CLINIC | Facility: CLINIC | Age: 73
End: 2023-10-26
Payer: MEDICARE

## 2023-10-26 DIAGNOSIS — I10 ESSENTIAL HYPERTENSION: ICD-10-CM

## 2023-10-26 DIAGNOSIS — F43.9 STRESS AT HOME: ICD-10-CM

## 2023-10-30 RX ORDER — LISINOPRIL AND HYDROCHLOROTHIAZIDE 20; 12.5 MG/1; MG/1
1 TABLET ORAL DAILY
Qty: 90 TABLET | Refills: 3 | Status: SHIPPED | OUTPATIENT
Start: 2023-10-30

## 2023-10-30 RX ORDER — SERTRALINE HYDROCHLORIDE 25 MG/1
25 TABLET, FILM COATED ORAL DAILY
Qty: 90 TABLET | Refills: 3 | Status: SHIPPED | OUTPATIENT
Start: 2023-10-30

## 2023-11-16 ENCOUNTER — ON CAMPUS - OUTPATIENT (AMBULATORY)
Dept: URBAN - METROPOLITAN AREA HOSPITAL 2 | Facility: HOSPITAL | Age: 73
End: 2023-11-16
Payer: COMMERCIAL

## 2023-11-16 ENCOUNTER — OFFICE (AMBULATORY)
Dept: URBAN - METROPOLITAN AREA PATHOLOGY 4 | Facility: PATHOLOGY | Age: 73
End: 2023-11-16
Payer: COMMERCIAL

## 2023-11-16 VITALS
HEART RATE: 76 BPM | DIASTOLIC BLOOD PRESSURE: 85 MMHG | WEIGHT: 188 LBS | DIASTOLIC BLOOD PRESSURE: 64 MMHG | HEART RATE: 83 BPM | SYSTOLIC BLOOD PRESSURE: 90 MMHG | OXYGEN SATURATION: 96 % | OXYGEN SATURATION: 94 % | SYSTOLIC BLOOD PRESSURE: 113 MMHG | OXYGEN SATURATION: 99 % | HEART RATE: 65 BPM | HEART RATE: 58 BPM | OXYGEN SATURATION: 93 % | HEART RATE: 63 BPM | HEART RATE: 64 BPM | DIASTOLIC BLOOD PRESSURE: 58 MMHG | SYSTOLIC BLOOD PRESSURE: 129 MMHG | SYSTOLIC BLOOD PRESSURE: 86 MMHG | DIASTOLIC BLOOD PRESSURE: 75 MMHG | DIASTOLIC BLOOD PRESSURE: 50 MMHG | OXYGEN SATURATION: 98 % | HEART RATE: 77 BPM | SYSTOLIC BLOOD PRESSURE: 905 MMHG | OXYGEN SATURATION: 97 % | SYSTOLIC BLOOD PRESSURE: 85 MMHG | RESPIRATION RATE: 16 BRPM | HEIGHT: 70 IN | DIASTOLIC BLOOD PRESSURE: 55 MMHG | RESPIRATION RATE: 17 BRPM | RESPIRATION RATE: 18 BRPM | TEMPERATURE: 97.8 F | SYSTOLIC BLOOD PRESSURE: 83 MMHG | DIASTOLIC BLOOD PRESSURE: 56 MMHG

## 2023-11-16 DIAGNOSIS — K63.5 POLYP OF COLON: ICD-10-CM

## 2023-11-16 DIAGNOSIS — Z09 ENCOUNTER FOR FOLLOW-UP EXAMINATION AFTER COMPLETED TREATMEN: ICD-10-CM

## 2023-11-16 DIAGNOSIS — D12.3 BENIGN NEOPLASM OF TRANSVERSE COLON: ICD-10-CM

## 2023-11-16 DIAGNOSIS — Z86.010 PERSONAL HISTORY OF COLONIC POLYPS: ICD-10-CM

## 2023-11-16 DIAGNOSIS — K64.1 SECOND DEGREE HEMORRHOIDS: ICD-10-CM

## 2023-11-16 PROCEDURE — 45385 COLONOSCOPY W/LESION REMOVAL: CPT | Mod: PT | Performed by: INTERNAL MEDICINE

## 2023-11-16 PROCEDURE — 88305 TISSUE EXAM BY PATHOLOGIST: CPT | Mod: 26 | Performed by: INTERNAL MEDICINE

## 2023-12-12 ENCOUNTER — OFFICE VISIT (OUTPATIENT)
Dept: SLEEP MEDICINE | Facility: CLINIC | Age: 73
End: 2023-12-12
Payer: MEDICARE

## 2023-12-12 VITALS
DIASTOLIC BLOOD PRESSURE: 59 MMHG | OXYGEN SATURATION: 99 % | WEIGHT: 190 LBS | BODY MASS INDEX: 27.2 KG/M2 | HEART RATE: 50 BPM | SYSTOLIC BLOOD PRESSURE: 120 MMHG | HEIGHT: 70 IN

## 2023-12-12 DIAGNOSIS — G47.33 OSA TREATED WITH BIPAP: Primary | ICD-10-CM

## 2023-12-12 PROCEDURE — G0463 HOSPITAL OUTPT CLINIC VISIT: HCPCS

## 2023-12-12 NOTE — PROGRESS NOTES
"  [address] [facility], [address]  [place] [address]  Phone   Fax       SLEEP CLINIC FOLLOW UP PROGRESS NOTE.    Jaret Monte  [id number]   1950  73 y.o.  male      PCP: Tamara Escalante APRN      Date of visit: 12/12/2023    Chief Complaint   Patient presents with    Follow-up    Sleep Apnea       HPI:  This is a 73 y.o. years old patient is here for the management of obstructive sleep apnea.  Sleep apnea is moderate in severity with a AHI of 24/hr. Patient is using positive airway pressure therapy with auto BiPAP and the symptoms of sleep apnea have improved significantly on the therapy. Normally patient goes to bed at 9 PM and wakes up at 6 AM .  The patient wakes up 1 time(s) during the night and has no problem going back to sleep.  Feels refreshed after waking up.       Medications and allergies are reviewed by me and documented in the encounter.     SOCIAL (habits pertaining to sleep medicine)  History tobacco use:No   History of alcohol use: 0 per week  Caffeine use: 0     REVIEW OF SYSTEMS:   Pertaining positive symptoms are:  [place] Sleepiness Scale :Total score: 2       PHYSICAL EXAMINATION:  CONSTITUTIONAL:  Vitals:    12/12/23 0700   BP: 120/59   BP Location: Left arm   Patient Position: Sitting   Pulse: 50   SpO2: 99%   Weight: 86.2 kg (190 lb)   Height: 177.8 cm (70\")    Body mass index is 27.26 kg/m².   NOSE: nasal passages are clear, No deformities noted   RESP SYSTEM: Not in any respiratory distress, no chest deformities noted,   CARDIOVASULAR: No edema noted  NEURO: Oriented x 3, gait normal,  Mood and affect appeared appropriate      Data reviewed:  The Smart card downloaded on 12/12/2023 has been reviewed independently by me for compliance and discussed the data with the patient.   Compliance; 100%  More than 4 hr use, 100%  Average use of the device 8 hours and 45 minutes per night  Residual AHI: 1.2 /hr (goal < 5.0 /hr)  Mask " type: Fullface mask  Device: DreamStation  DME: [facility]      ASSESSMENT AND PLAN:  Obstructive sleep apnea ( G 47.33).  The symptoms of sleep apnea have improved with the device and the treatment.  Patient's compliance with the device is excellent for treatment of sleep apnea.  I have independently reviewed the smart card down load and discussed with the patient the download data and encouarged the patient to continue to use the device.The residual AHI is acceptable. The device is benefiting the patient and the device is medically necessary.  Without proper control of sleep apnea and good compliance there is a increased risk for hypertension, diabetes mellitus and nonrestorative sleep with hypersomnia which can increase risk for motor vehicle accidents.  Untreated sleep apnea is also a risk factor for development of atrial fibrillation, pulmonary hypertension, insulin resistance and stroke. The patient is also instructed to get the supplies from the DME company and and change them on a regular basis.  A prescription for supplies has been sent to the DME company.  I have also discussed the good sleep hygiene habits and adequate amount of sleep needed for good health.  Hypertension.  Essential hypertension is highly correlated with sleep apnea. Treating sleep apnea will assist in good blood pressure control.  Return in about 1 year (around 12/12/2024) for with smart card down load. . Patient's questions were answered.    12/12/2023  Jian Davila MD  Sleep Medicine.  Medical Director,   [facility] sleep [facility].

## 2024-01-04 ENCOUNTER — TELEPHONE (OUTPATIENT)
Dept: SURGERY | Facility: CLINIC | Age: 74
End: 2024-01-04
Payer: MEDICARE

## 2024-01-04 DIAGNOSIS — R91.8 MULTIPLE PULMONARY NODULES: Primary | ICD-10-CM

## 2024-01-04 NOTE — TELEPHONE ENCOUNTER
Called pt regarding new pt appointment on 1/12 arriving at 9:30am. Patient ws given address to nic office as well.  [facility] stated [facility]. Patient was agreeable and stated understanding.

## 2024-01-09 ENCOUNTER — TELEPHONE (OUTPATIENT)
Dept: SURGERY | Facility: CLINIC | Age: 74
End: 2024-01-09
Payer: MEDICARE

## 2024-01-10 PROBLEM — Q61.02 MULTIPLE RENAL CYSTS: Status: ACTIVE | Noted: 2024-01-10

## 2024-01-10 PROBLEM — J98.4 SCARRING OF LUNG: Status: ACTIVE | Noted: 2024-01-10

## 2024-01-10 PROBLEM — I70.90 ATHEROSCLEROSIS: Status: ACTIVE | Noted: 2024-01-10

## 2024-01-10 PROBLEM — I70.0 ATHEROSCLEROSIS OF ABDOMINAL AORTA: Status: ACTIVE | Noted: 2024-01-10

## 2024-01-12 ENCOUNTER — OFFICE VISIT (OUTPATIENT)
Dept: SURGERY | Facility: CLINIC | Age: 74
End: 2024-01-12
Payer: MEDICARE

## 2024-01-12 ENCOUNTER — PATIENT ROUNDING (BHMG ONLY) (OUTPATIENT)
Dept: SURGERY | Facility: CLINIC | Age: 74
End: 2024-01-12
Payer: MEDICARE

## 2024-01-12 VITALS
WEIGHT: 204.4 LBS | SYSTOLIC BLOOD PRESSURE: 155 MMHG | OXYGEN SATURATION: 98 % | BODY MASS INDEX: 29.26 KG/M2 | HEART RATE: 53 BPM | HEIGHT: 70 IN | DIASTOLIC BLOOD PRESSURE: 85 MMHG

## 2024-01-12 DIAGNOSIS — R91.1 SOLITARY PULMONARY NODULE: Primary | ICD-10-CM

## 2024-01-12 RX ORDER — ASPIRIN 81 MG/1
TABLET ORAL
COMMUNITY

## 2024-01-12 RX ORDER — TRAZODONE HYDROCHLORIDE 50 MG/1
TABLET ORAL
COMMUNITY

## 2024-01-12 NOTE — PROGRESS NOTES
"Chief Complaint  Left-sided 8 mm pulmonary nodule.    Subjective        Jaret Monte presents to [facility] THORACIC SURGERY  History of Present Illness  Mr. Monte is a very pleasant 74-year-old gentleman we [facility] for an incidentally found 8 mm pulmonary nodule.  He is an ex-smoker and stopped smoking approximately 25 years ago.  Prior to that he states he smoked roughly 25 years for 1 pack/day.  He denies any current symptoms.  He denies any fevers, chills and or cough.  He denies any unintentional weight loss.  This was an incidentally found lung nodule on a yearly low-dose noncontrasted CT scan of the chest for lung cancer screening.    Objective   Vital Signs:  /85 (BP Location: Left arm, Patient Position: Sitting, Cuff Size: Adult)   Pulse 53   Ht 177.8 cm (70\")   Wt 92.7 kg (204 lb 6.4 oz)   SpO2 98%   BMI 29.33 kg/m²   Estimated body mass index is 29.33 kg/m² as calculated from the following:    Height as of this encounter: 177.8 cm (70\").    Weight as of this encounter: 92.7 kg (204 lb 6.4 oz).               Physical Exam  Constitutional:       Appearance: Normal appearance.   Cardiovascular:      Rate and Rhythm: Normal rate and regular rhythm.      Pulses: Normal pulses.      Heart sounds: Normal heart sounds.   Pulmonary:      Effort: Pulmonary effort is normal.      Breath sounds: Normal breath sounds.   Skin:     Capillary Refill: Capillary refill takes less than 2 seconds.   Neurological:      General: No focal deficit present.      Mental Status: He is alert and oriented to person, place, and time.   Psychiatric:         Mood and Affect: Mood normal.         Behavior: Behavior normal.         Thought Content: Thought content normal.         Judgment: Judgment normal.        Result Review :  Disc of the CT noncontrasted chest was visualized and reviewed by myself.  I agree with the official interpretation.  8 mm left upper lobe " pulmonary nodule.           Assessment and Plan   Diagnoses and all orders for this visit:    1. Solitary pulmonary nodule (Primary)    Mr. Monte is a 74-year-old gentleman who presents today for an incidentally found left upper lobe pulmonary nodule.  This measures approximately 8 mm in size and pleural-based.  He is an ex-smoker and stopped smoking approximately 25 years ago.  He currently is asymptomatic.  He is scheduled to have a [facility]-up [facility]contrasted [facility]  This is what I would recommend.  I am okay with him obtaining a [facility] the [facility] and he can follow-up with us in 6 months after this is obtained.       I spent 60 minutes caring for Jaret on this date of service. This time includes time spent by me in the following activities:preparing for the visit, reviewing tests, obtaining and/or reviewing a separately obtained history, performing a medically appropriate examination and/or evaluation , counseling and educating the patient/family/caregiver, ordering medications, tests, or procedures, referring and communicating with other health care professionals , documenting information in the medical record, independently interpreting results and communicating that information with the patient/family/caregiver, and care coordination  Follow Up   No follow-ups on file.  Patient was given instructions and counseling regarding his condition or for health maintenance advice. Please see specific information pulled into the AVS if appropriate.

## 2024-05-21 ENCOUNTER — OFFICE VISIT (OUTPATIENT)
Dept: SLEEP MEDICINE | Facility: CLINIC | Age: 74
End: 2024-05-21
Payer: MEDICARE

## 2024-05-21 VITALS
BODY MASS INDEX: 29.35 KG/M2 | SYSTOLIC BLOOD PRESSURE: 111 MMHG | HEART RATE: 59 BPM | OXYGEN SATURATION: 95 % | HEIGHT: 70 IN | DIASTOLIC BLOOD PRESSURE: 60 MMHG | WEIGHT: 205 LBS

## 2024-05-21 DIAGNOSIS — G47.33 OSA TREATED WITH BIPAP: Primary | ICD-10-CM

## 2024-05-21 DIAGNOSIS — I10 PRIMARY HYPERTENSION: ICD-10-CM

## 2024-05-21 PROCEDURE — G0463 HOSPITAL OUTPT CLINIC VISIT: HCPCS

## 2024-05-21 NOTE — PROGRESS NOTES
"  [id number] [facility], [address]  [place] [address]  Phone   Fax       SLEEP CLINIC FOLLOW UP PROGRESS NOTE.    Jaret Monte  [id number]   1950  74 y.o.  male      PCP: Tamara Escaalnte APRN      Date of visit: 5/21/2024    Chief Complaint   Patient presents with    Follow-up    Sleep Apnea       HPI:  This is a 74 y.o. years old patient is here for the management of obstructive sleep apnea.  Sleep apnea is moderate in severity with a AHI of 24/hr. Patient is using positive airway pressure therapy with auto BiPAP and the symptoms of sleep apnea have improved significantly on the therapy. Normally patient goes to bed at 830 PM and wakes up at 630 AM .  The patient wakes up 1 time(s) during the night and has no problem going back to sleep.  Feels refreshed after waking up.     I have reviewed his sleep study which was done in April 2019.  Patient is eligible for a new BiPAP.  He does has a [employer] DreamStation BiPAP.  His compliance is excellent and the BiPAP is benefiting the patient and it is medically necessary.  I have sent an order to get a new BiPAP at 16/10 and see me back within 90 days for compliance check    Medications and allergies are reviewed by me and documented in the encounter.     SOCIAL (habits pertaining to sleep medicine)  History tobacco use:No   History of alcohol use: 0 per week  Caffeine use: 0     REVIEW OF SYSTEMS:   Pertaining positive symptoms are:  [place] Sleepiness Scale :Total score: 3       PHYSICAL EXAMINATION:  CONSTITUTIONAL:  Vitals:    05/21/24 0900   BP: 111/60   BP Location: Left arm   Patient Position: Sitting   Pulse: 59   SpO2: 95%   Weight: 93 kg (205 lb)   Height: 177.8 cm (70\")    Body mass index is 29.41 kg/m².   NOSE: nasal passages are clear, No deformities noted   RESP SYSTEM: Not in any respiratory distress, no chest deformities noted,   CARDIOVASULAR: No edema noted  NEURO: Oriented x 3, gait normal,  " Mood and affect appeared appropriate      Data reviewed:  The Smart card downloaded on 5/21/2024 has been reviewed independently by me for compliance and discussed the data with the patient.   Compliance; 100%  More than 4 hr use, 100%  Average use of the device 9 hours and 29 minutes per night  Residual AHI: 0.7 /hr (Optimal < 5/hr, Good <10/hr, Adequate reduce by 75% from baseline)  Mask type: Fullface  Device: [employer] DreamStation BiPAP  DME: [facility]      ASSESSMENT AND PLAN:  Obstructive sleep apnea ( G 47.33).  Patient has moderate sleep apnea and his sleep study was conducted in April 2019 and is BiPAP is more than 5 years and he is eligible for a new BiPAP.  I have sent a prescription to [facility] to get him a BiPAP at 16/10.. The device is benefiting the patient and the device is medically necessary.  Without proper control of sleep apnea and good compliance there is a increased risk for hypertension, diabetes mellitus and nonrestorative sleep with hypersomnia which can increase risk for motor vehicle accidents.  Untreated sleep apnea is also a risk factor for development of atrial fibrillation, pulmonary hypertension, insulin resistance and stroke.  Patient is interested to follow-up with me for compliance  Hypertension   Essential hypertension is highly correlated with sleep apnea. Treating sleep apnea will assist in good blood pressure control.  Return for 31 to 90 days after PAP setup with down load. . Patient's questions were answered.    5/21/2024  Jian Davila MD  Sleep Medicine.  Medical Director,   [facility] [place] [facility] [place] sleep centers.

## 2024-06-14 ENCOUNTER — TELEPHONE (OUTPATIENT)
Dept: SURGERY | Facility: CLINIC | Age: 74
End: 2024-06-14
Payer: MEDICARE

## 2024-06-14 NOTE — TELEPHONE ENCOUNTER
Called and spoke to the patient about getting his appointment switched to another provider. Dr Tran is no longer with the practice. [facility]. I informed patient I would put this in his chart and to please contact us if he needed anything.

## 2024-08-19 DIAGNOSIS — I10 ESSENTIAL HYPERTENSION: ICD-10-CM

## 2024-08-19 RX ORDER — LISINOPRIL AND HYDROCHLOROTHIAZIDE 20; 12.5 MG/1; MG/1
1 TABLET ORAL DAILY
Qty: 90 TABLET | Refills: 2 | Status: SHIPPED | OUTPATIENT
Start: 2024-08-19

## 2024-11-25 NOTE — PROGRESS NOTES
Subjective   The ABCs of the Annual Wellness Visit  Medicare Wellness Visit      Jaret Monte is a 74 y.o. patient who presents for a Medicare Wellness Visit.    The following portions of the patient's history were reviewed and   updated as appropriate: allergies, current medications, past family history, past medical history, past social history, past surgical history, and problem list.    Compared to one year ago, the patient's physical   health is the same.  Compared to one year ago, the patient's mental   health is the same.    Recent Hospitalizations:  He was not admitted to the hospital during the last year.     Current Medical Providers:  Patient Care Team:  Tamara Escalante APRN as PCP - General (Nurse Practitioner)  Urology - Dr. Crawford   [facility] - Audiology and Ophthalmology as well as other preventive care.  [facility] - [facility] , [facility]   Dental - Dr. Fitzpatrick    Outpatient Medications Prior to Visit   Medication Sig Dispense Refill    atorvastatin (LIPITOR) 20 MG tablet Take 1 tablet by mouth Daily.      lisinopril-hydrochlorothiazide (PRINZIDE,ZESTORETIC) 20-12.5 MG per tablet TAKE 1 TABLET DAILY 90 tablet 2     No facility-administered medications prior to visit.     No opioid medication identified on active medication list. I have reviewed chart for other potential  high risk medication/s and harmful drug interactions in the elderly.      Aspirin is not on active medication list.  Aspirin use is not indicated based on review of current medical condition/s. Risk of harm outweighs potential benefits.  .    Patient Active Problem List   Diagnosis    Other dorsalgia    Hypertension    Enlarged prostate without lower urinary tract symptoms (luts)    History of hepatitis C    MAXIMO treated with BiPAP    Full code status    Multiple pulmonary nodules    Former smoker    Personal history of colonic polyps    Atopic dermatitis in adult    Decreased hearing of  "both ears    Hematuria    BMI 30.0-30.9,adult    Coronary artery calcification    Hepatic cyst    Multilevel degenerative disc disease    Diverticulosis    Anxiety    Primary insomnia    Enlarged thyroid    History of hematuria    Multiple thyroid nodules    Anxiety and depression    Atherosclerosis of abdominal aorta    Scarring of lung    Multiple renal cysts     Advance Care Planning Advance Directive is not on file.  ACP discussion was declined by the patient. Patient does not have an advance directive, declines further assistance.            Objective   Vitals:    12/03/24 0808   BP: 110/62   BP Location: Right arm   Patient Position: Sitting   Cuff Size: Large Adult   Pulse: 54   Resp: 18   Temp: 97.4 °F (36.3 °C)   TempSrc: Temporal   SpO2: 100%   Weight: 95.2 kg (209 lb 12.8 oz)   Height: 177.8 cm (70\")   PainSc: 0-No pain       Estimated body mass index is 30.1 kg/m² as calculated from the following:    Height as of this encounter: 177.8 cm (70\").    Weight as of this encounter: 95.2 kg (209 lb 12.8 oz).    BMI is >= 25 and <30. (Overweight) The following options were offered after discussion;: none (medical contraindication)       Does the patient have evideny ce of cognitive impairment? No                                                                                                Health  Risk Assessment    Smoking Status:  Social History     Tobacco Use   Smoking Status Former    Current packs/day: 0.00    Average packs/day: 1 pack/day for 38.4 years (38.4 ttl pk-yrs)    Types: Cigarettes    Start date: 8/20/1970    Quit date: 1/1/2009    Years since quitting: 15.9   Smokeless Tobacco Never   Tobacco Comments    Quit 2007     Alcohol Consumption:  Social History     Substance and Sexual Activity   Alcohol Use Not Currently       Fall Risk Screen  STEADI Fall Risk Assessment was completed, and patient is at LOW risk for falls.Assessment completed on:12/3/2024    Depression Screening   Little interest or " pleasure in doing things? Not at all   Feeling down, depressed, or hopeless? Not at all   PHQ-2 Total Score 0      Health Habits and Functional and Cognitive Screenin/3/2024     8:07 AM   Functional & Cognitive Status   Do you have difficulty preparing food and eating? No   Do you have difficulty bathing yourself, getting dressed or grooming yourself? No   Do you have difficulty using the toilet? No   Do you have difficulty moving around from place to place? No   Do you have trouble with steps or getting out of a bed or a chair? No   Current Diet Well Balanced Diet   Dental Exam Up to date   Eye Exam Up to date   Exercise (times per week) 0 times per week   Current Exercises Include Yard Work   Do you need help using the phone?  No   Are you deaf or do you have serious difficulty hearing?  No   Do you need help to go to places out of walking distance? No   Do you need help shopping? No   Do you need help preparing meals?  No   Do you need help with housework?  No   Do you need help with laundry? No   Do you need help taking your medications? No   Do you need help managing money? No   Do you ever drive or ride in a car without wearing a seat belt? No   Have you felt unusual stress, anger or loneliness in the last month? No   Who do you live with? Spouse   If you need help, do you have trouble finding someone available to you? No   Have you been bothered in the last four weeks by sexual problems? No   Do you have difficulty concentrating, remembering or making decisions? No           Age-appropriate Screening Schedule:  Refer to the list below for future screening recommendations based on patient's age, sex and/or medical conditions. Orders for these recommended tests are listed in the plan section. The patient has been provided with a written plan.    Health Maintenance List  Health Maintenance   Topic Date Due    LIPID PANEL  2024    BMI FOLLOWUP  2024    COVID-19 Vaccine (2 - 2024-25 season)  12/05/2024 (Originally 9/1/2024)    INFLUENZA VACCINE  03/31/2025 (Originally 7/1/2024)    Hepatitis B (1 of 3 - Risk 3-dose series) 12/03/2025 (Originally 1/10/2012)    ANNUAL WELLNESS VISIT  12/03/2025    COLORECTAL CANCER SCREENING  11/16/2026    TDAP/TD VACCINES (2 - Td or Tdap) 06/21/2032    Pneumococcal Vaccine 65+  Completed    AAA SCREEN (ONE-TIME)  Completed    ZOSTER VACCINE  Completed    HEPATITIS C SCREENING  Discontinued    LUNG CANCER SCREENING  Discontinued                                                                                                                                                CMS Preventative Services Quick Reference  Risk Factors Identified During Encounter  None Identified    The above risks/problems have been discussed with the patient.  Pertinent information has been shared with the patient in the After Visit Summary.  An After Visit Summary and PPPS were made available to the patient.    Follow Up:   Next Medicare Wellness visit to be scheduled in 1 year.         Additional E&M Note during same encounter follows:  Patient has additional, significant, and separately identifiable condition(s)/problem(s) that require work above and beyond the Medicare Wellness Visit     Chief Complaint  Medicare Wellness-subsequent    Subjective    HPI  Jaret is also being seen today for additional medical problem/s.       The patient is a 74-year-old male who presents for a Medicare wellness visit.    He reports no current health concerns. He is compliant with his medication regimen, which includes atorvastatin 20 mg nightly for cholesterol management. He does not engage in regular exercise and does not monitor his blood pressure at home.    He is under the care of a urologist with annual PSA tests conducted by Dr. Crawford. He mentions a small amount of blood in his urine, which has been investigated through various tests.    He reports no respiratory symptoms such as coughing, wheezing, or  "shortness of breath. He also reports no gastrointestinal symptoms such as blood in stool, constipation, or diarrhea. Additionally, he has no dermatological issues such as skin spots, moles, or rashes. His mental health appears stable, with no reported anxiety or depression. He reports no cardiovascular symptoms such as chest pain or leg swelling.    [facility] and has an upcoming appointment with his ophthalmologist. He has sleep apnea and uses a CPAP machine nightly. He has a long-standing spot on the roof of his mouth, which has been evaluated by the [facility] and deemed normal. He reports no growth in his thyroid nodules or difficulty swallowing.    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 He also reports a recent fall from a 10-foot ladder, resulting in minor ankle pain, but no visible bruising or swelling. He has been managing this with an Ace bandage.    He has had 2 dental implants and is supposed to go back in 02/2025.     IMMUNIZATIONS  He has had a recent tetanus shot.    Review of Systems   Respiratory:  Negative for cough, shortness of breath and wheezing.    Cardiovascular:  Negative for chest pain, palpitations and leg swelling.   Gastrointestinal: Negative.    All other systems reviewed and are negative.         Objective   Vital Signs:  /62 (BP Location: Right arm, Patient Position: Sitting, Cuff Size: Large Adult)   Pulse 54   Temp 97.4 °F (36.3 °C) (Temporal)   Resp 18   Ht 177.8 cm (70\")   Wt 95.2 kg (209 lb 12.8 oz)   SpO2 100%   BMI 30.10 kg/m²   Physical Exam  Vitals and nursing note reviewed.   Constitutional:       General: He is not in acute distress.     Appearance: Normal appearance. He is well-groomed. He is not ill-appearing.   HENT:      Head: Normocephalic and atraumatic.      Jaw: There is normal jaw occlusion.      Right Ear: Hearing, tympanic membrane, ear canal and external ear " normal.      Left Ear: Hearing, tympanic membrane, ear canal and external ear normal.      Nose: Nose normal.      Mouth/Throat:      Lips: Pink.      Mouth: Mucous membranes are moist.      Pharynx: Oropharynx is clear. Uvula midline.   Eyes:      General: Lids are normal. Vision grossly intact.      Extraocular Movements: Extraocular movements intact.      Conjunctiva/sclera: Conjunctivae normal.      Pupils: Pupils are equal, round, and reactive to light. Pupils are equal.   Neck:      Thyroid: No thyromegaly or thyroid tenderness.      Vascular: No carotid bruit.   Cardiovascular:      Rate and Rhythm: Normal rate and regular rhythm.      Pulses: Normal pulses.      Heart sounds: Normal heart sounds.   Pulmonary:      Effort: Pulmonary effort is normal.      Breath sounds: Normal breath sounds and air entry.   Abdominal:      General: Abdomen is flat. Bowel sounds are normal.      Palpations: Abdomen is soft. Abdomen is not rigid.      Tenderness: There is no abdominal tenderness. There is no right CVA tenderness or left CVA tenderness.   Musculoskeletal:         General: Normal range of motion.      Cervical back: Full passive range of motion without pain, normal range of motion and neck supple.      Right lower leg: No edema.      Left lower leg: No edema.   Skin:     General: Skin is warm and dry.      Capillary Refill: Capillary refill takes less than 2 seconds.      Comments: Small laceration to left thumb tip.      Neurological:      General: No focal deficit present.      Mental Status: He is alert and oriented to person, place, and time. Mental status is at baseline.   Psychiatric:         Attention and Perception: Attention and perception normal.         Mood and Affect: Mood and affect normal.         Speech: Speech normal.         Behavior: Behavior normal. Behavior is cooperative.         Thought Content: Thought content normal.           Vital Signs  Blood pressure is 110/62.    The following data  was reviewed by: SHANTE Sol on 12/03/2024:        Results                Assessment and Plan Additional age appropriate preventative wellness advice topics were discussed during today's preventative wellness exam(some topics already addressed during AWV portion of the note above):    Physical Activity: Advised cardiovascular activity 150 minutes per week as tolerated. (example brisk walk for 30 minutes, 5 days a week).   Nutrition: Discussed nutrition plan with patient. Information shared in after visit summary. Goal is for a well balanced diet to enhance overall health.   Healthy Weight: Discussed current and goal BMI with patient. Steps to attain this goal discussed. Information shared in after visit summary.   Gun Safety Awareness Discussion: Information Shared in after visit summary.   Tobacco Misuse Discussion: Information shared in after visit summary.   Alcohol Misuse Discussion: Information shared in after visit summary.   Drug Misuse Discussion:  Avoidance of Drug Use recommendation given.  Information shared in after visit summary.   Motor Vehicle Safety Discussion:  Wearing Seatbelt While in Motor Vehicle recommendation. Adhering to posted speed limit recommendation.   Injury Prevention Discussion:  Information shared in after visit summary.      Assessment & Plan  Medicare annual wellness visit, subsequent  Discussed injury prevention, diet and exercise, safe sexual practices, and screening for common diseases. Encouraged use of sunscreen and seatbelts. Avoidance of tobacco encouraged. Limitation or avoidance of alcohol encouraged.      Anticipatory guidance given and routine screenings recommended with recommendations of yearly dental and eye exams., monitoring of blood pressure, lipids, and screening for colon and lung cancer risks.     Orders:    POCT urinalysis dipstick, multipro    Essential hypertension  Hypertension controlled with diet, lifestyle and medication.    Will check labs and  refill medication as needed.       Orders:    Lipid Panel With LDL / HDL Ratio    CBC & Differential    Comprehensive Metabolic Panel    atorvastatin (LIPITOR) 20 MG tablet; Take 1 tablet by mouth Daily.    lisinopril-hydrochlorothiazide (PRINZIDE,ZESTORETIC) 20-12.5 MG per tablet; Take 1 tablet by mouth Daily.    MAXIMO treated with BiPAP  He uses a CPAP machine every night for sleep apnea, [facility].       Prediabetes  Last A1c checked 5.7.  Will recheck.  Orders:    TSH Rfx On Abnormal To Free T4    Hemoglobin A1c    Screening for prostate cancer  Screening by [facility].  Patient has an upcoming appointment.       Immunization counseling  Discussed COVID-vaccine, influenza, Tdap and patient refuses at this time.         Decreased hearing of both ears  [facility] and uses [facility] hearing aids.        Primary hypertension  Hypertension controlled with diet, lifestyle and medication.    Will check labs and refill medication as needed.            Laceration of left thumb without foreign body without damage to nail, initial encounter  He has a cut on his thumb which he has been treating with Neosporin. No further treatment is required at this time.  Tetanus immunization up-to-date.       Sprain of right ankle, unspecified ligament, initial encounter  He experienced a minor ankle sprain after falling from a ladder. He reports no pain, swelling, or bruising currently and does not require further evaluation.       Anxiety and depression  Controlled and reports no need for medication.                 1. Medicare wellness.  His blood pressure is 110/62, which is within the normal range. [facility] He has been advised to engage in physical activity for at least 150 minutes per week, limit alcohol and caffeine intake, ensure adequate hydration, adhere to a heart-healthy Mediterranean diet, always wear a seatbelt, apply sunscreen regularly, and maintain good sleep hygiene.    2.  Medication management.  A refill of his medications for the entire year has been provided at  pharmacy. He is taking atorvastatin 20 mg every night for cholesterol.    3. Prostate health.  He is under the care of a urologist, Dr. Crawford, for his prostate. PSA testing will be managed by his urologist.    4. Hearing and vision.  He [facility]. He has an upcoming appointment with his eye doctor on Thursday.    5. Sleep apnea.  He uses a CPAP machine every night for sleep apnea, [facility].    6. Thumb injury.  He has a cut on his thumb which he has been treating with Neosporin. No further treatment is required at this time.    7. Ankle sprain.  He experienced a minor ankle sprain after falling from a ladder. He reports no pain, swelling, or bruising currently and does not require further evaluation.              Follow Up   No follow-ups on file.  Patient was given instructions and counseling regarding his condition or for health maintenance advice. Please see specific information pulled into the AVS if appropriate.  Patient or patient representative verbalized consent for the use of Ambient Listening during the visit with  SHANTE Sol for chart documentation. 12/3/2024  08:07 EST

## 2024-12-03 ENCOUNTER — OFFICE VISIT (OUTPATIENT)
Dept: FAMILY MEDICINE CLINIC | Facility: CLINIC | Age: 74
End: 2024-12-03
Payer: MEDICARE

## 2024-12-03 VITALS
BODY MASS INDEX: 30.03 KG/M2 | DIASTOLIC BLOOD PRESSURE: 62 MMHG | HEIGHT: 70 IN | SYSTOLIC BLOOD PRESSURE: 110 MMHG | WEIGHT: 209.8 LBS | OXYGEN SATURATION: 100 % | RESPIRATION RATE: 18 BRPM | HEART RATE: 54 BPM | TEMPERATURE: 97.4 F

## 2024-12-03 DIAGNOSIS — H91.93 DECREASED HEARING OF BOTH EARS: ICD-10-CM

## 2024-12-03 DIAGNOSIS — I10 PRIMARY HYPERTENSION: ICD-10-CM

## 2024-12-03 DIAGNOSIS — G47.33 OSA TREATED WITH BIPAP: ICD-10-CM

## 2024-12-03 DIAGNOSIS — Z71.85 IMMUNIZATION COUNSELING: ICD-10-CM

## 2024-12-03 DIAGNOSIS — S93.401A SPRAIN OF RIGHT ANKLE, UNSPECIFIED LIGAMENT, INITIAL ENCOUNTER: ICD-10-CM

## 2024-12-03 DIAGNOSIS — Z00.00 MEDICARE ANNUAL WELLNESS VISIT, SUBSEQUENT: Primary | ICD-10-CM

## 2024-12-03 DIAGNOSIS — I10 ESSENTIAL HYPERTENSION: ICD-10-CM

## 2024-12-03 DIAGNOSIS — Z12.5 SCREENING FOR PROSTATE CANCER: ICD-10-CM

## 2024-12-03 DIAGNOSIS — S61.012A LACERATION OF LEFT THUMB WITHOUT FOREIGN BODY WITHOUT DAMAGE TO NAIL, INITIAL ENCOUNTER: ICD-10-CM

## 2024-12-03 DIAGNOSIS — F41.9 ANXIETY AND DEPRESSION: ICD-10-CM

## 2024-12-03 DIAGNOSIS — R73.03 PREDIABETES: ICD-10-CM

## 2024-12-03 DIAGNOSIS — F32.A ANXIETY AND DEPRESSION: ICD-10-CM

## 2024-12-03 PROBLEM — F43.9 STRESS AT HOME: Status: RESOLVED | Noted: 2023-08-29 | Resolved: 2024-12-03

## 2024-12-03 LAB
BILIRUB BLD-MCNC: NEGATIVE MG/DL
CLARITY, POC: CLEAR
COLOR UR: YELLOW
GLUCOSE UR STRIP-MCNC: NEGATIVE MG/DL
KETONES UR QL: NEGATIVE
LEUKOCYTE EST, POC: NEGATIVE
NITRITE UR-MCNC: NEGATIVE MG/ML
PH UR: 6 [PH] (ref 5–8)
PROT UR STRIP-MCNC: NEGATIVE MG/DL
RBC # UR STRIP: ABNORMAL /UL
SP GR UR: 1.03 (ref 1–1.03)
UROBILINOGEN UR QL: ABNORMAL

## 2024-12-03 PROCEDURE — 1170F FXNL STATUS ASSESSED: CPT

## 2024-12-03 PROCEDURE — 99214 OFFICE O/P EST MOD 30 MIN: CPT

## 2024-12-03 PROCEDURE — 3078F DIAST BP <80 MM HG: CPT

## 2024-12-03 PROCEDURE — 1126F AMNT PAIN NOTED NONE PRSNT: CPT

## 2024-12-03 PROCEDURE — G0439 PPPS, SUBSEQ VISIT: HCPCS

## 2024-12-03 PROCEDURE — 81003 URINALYSIS AUTO W/O SCOPE: CPT

## 2024-12-03 PROCEDURE — 3074F SYST BP LT 130 MM HG: CPT

## 2024-12-03 RX ORDER — LISINOPRIL AND HYDROCHLOROTHIAZIDE 12.5; 2 MG/1; MG/1
1 TABLET ORAL DAILY
Qty: 90 TABLET | Refills: 3 | Status: SHIPPED | OUTPATIENT
Start: 2024-12-03

## 2024-12-03 RX ORDER — ATORVASTATIN CALCIUM 20 MG/1
20 TABLET, FILM COATED ORAL DAILY
Qty: 90 TABLET | Refills: 3 | Status: SHIPPED | OUTPATIENT
Start: 2024-12-03

## 2024-12-03 NOTE — ASSESSMENT & PLAN NOTE
Hypertension controlled with diet, lifestyle and medication.    Will check labs and refill medication as needed.

## 2024-12-04 LAB
ALBUMIN SERPL-MCNC: 4.5 G/DL (ref 3.8–4.8)
ALP SERPL-CCNC: 75 IU/L (ref 44–121)
ALT SERPL-CCNC: 15 IU/L (ref 0–44)
AST SERPL-CCNC: 20 IU/L (ref 0–40)
BASOPHILS # BLD AUTO: 0 X10E3/UL (ref 0–0.2)
BASOPHILS NFR BLD AUTO: 0 %
BILIRUB SERPL-MCNC: 0.5 MG/DL (ref 0–1.2)
BUN SERPL-MCNC: 21 MG/DL (ref 8–27)
BUN/CREAT SERPL: 17 (ref 10–24)
CALCIUM SERPL-MCNC: 9.4 MG/DL (ref 8.6–10.2)
CHLORIDE SERPL-SCNC: 103 MMOL/L (ref 96–106)
CHOLEST SERPL-MCNC: 129 MG/DL (ref 100–199)
CO2 SERPL-SCNC: 25 MMOL/L (ref 20–29)
CREAT SERPL-MCNC: 1.22 MG/DL (ref 0.76–1.27)
EGFRCR SERPLBLD CKD-EPI 2021: 63 ML/MIN/1.73
EOSINOPHIL # BLD AUTO: 0.2 X10E3/UL (ref 0–0.4)
EOSINOPHIL NFR BLD AUTO: 2 %
ERYTHROCYTE [DISTWIDTH] IN BLOOD BY AUTOMATED COUNT: 12.9 % (ref 11.6–15.4)
GLOBULIN SER CALC-MCNC: 2.2 G/DL (ref 1.5–4.5)
GLUCOSE SERPL-MCNC: 94 MG/DL (ref 70–99)
HBA1C MFR BLD: 5.7 % (ref 4.8–5.6)
HCT VFR BLD AUTO: 45.2 % (ref 37.5–51)
HDLC SERPL-MCNC: 50 MG/DL
HGB BLD-MCNC: 15 G/DL (ref 13–17.7)
IMM GRANULOCYTES # BLD AUTO: 0 X10E3/UL (ref 0–0.1)
IMM GRANULOCYTES NFR BLD AUTO: 0 %
LDLC SERPL CALC-MCNC: 62 MG/DL (ref 0–99)
LDLC/HDLC SERPL: 1.2 RATIO (ref 0–3.6)
LYMPHOCYTES # BLD AUTO: 2.6 X10E3/UL (ref 0.7–3.1)
LYMPHOCYTES NFR BLD AUTO: 28 %
MCH RBC QN AUTO: 31.3 PG (ref 26.6–33)
MCHC RBC AUTO-ENTMCNC: 33.2 G/DL (ref 31.5–35.7)
MCV RBC AUTO: 94 FL (ref 79–97)
MONOCYTES # BLD AUTO: 0.8 X10E3/UL (ref 0.1–0.9)
MONOCYTES NFR BLD AUTO: 9 %
NEUTROPHILS # BLD AUTO: 5.4 X10E3/UL (ref 1.4–7)
NEUTROPHILS NFR BLD AUTO: 61 %
PLATELET # BLD AUTO: 275 X10E3/UL (ref 150–450)
POTASSIUM SERPL-SCNC: 3.9 MMOL/L (ref 3.5–5.2)
PROT SERPL-MCNC: 6.7 G/DL (ref 6–8.5)
RBC # BLD AUTO: 4.8 X10E6/UL (ref 4.14–5.8)
SODIUM SERPL-SCNC: 142 MMOL/L (ref 134–144)
TRIGL SERPL-MCNC: 89 MG/DL (ref 0–149)
TSH SERPL DL<=0.005 MIU/L-ACNC: 3.12 UIU/ML (ref 0.45–4.5)
VLDLC SERPL CALC-MCNC: 17 MG/DL (ref 5–40)
WBC # BLD AUTO: 9 X10E3/UL (ref 3.4–10.8)

## 2025-04-21 DIAGNOSIS — I10 ESSENTIAL HYPERTENSION: ICD-10-CM

## 2025-04-21 RX ORDER — LISINOPRIL AND HYDROCHLOROTHIAZIDE 12.5; 2 MG/1; MG/1
1 TABLET ORAL DAILY
Qty: 90 TABLET | Refills: 3 | Status: SHIPPED | OUTPATIENT
Start: 2025-04-21

## 2025-04-21 RX ORDER — ATORVASTATIN CALCIUM 20 MG/1
20 TABLET, FILM COATED ORAL DAILY
Qty: 90 TABLET | Refills: 3 | Status: SHIPPED | OUTPATIENT
Start: 2025-04-21